# Patient Record
Sex: FEMALE | Race: BLACK OR AFRICAN AMERICAN | NOT HISPANIC OR LATINO | URBAN - METROPOLITAN AREA
[De-identification: names, ages, dates, MRNs, and addresses within clinical notes are randomized per-mention and may not be internally consistent; named-entity substitution may affect disease eponyms.]

---

## 2018-03-02 ENCOUNTER — AMBULATORY CONVERSION ENCOUNTER (OUTPATIENT)
Dept: FAMILY MEDICINE | Facility: CLINIC | Age: 23
End: 2018-03-02

## 2018-03-23 ENCOUNTER — OFFICE VISIT (OUTPATIENT)
Dept: OBSTETRICS AND GYNECOLOGY | Facility: CLINIC | Age: 23
End: 2018-03-23
Attending: OBSTETRICS & GYNECOLOGY
Payer: COMMERCIAL

## 2018-03-23 VITALS
SYSTOLIC BLOOD PRESSURE: 110 MMHG | HEIGHT: 66 IN | BODY MASS INDEX: 26.78 KG/M2 | WEIGHT: 166.6 LBS | DIASTOLIC BLOOD PRESSURE: 78 MMHG

## 2018-03-23 DIAGNOSIS — Z30.014 ENCOUNTER FOR INITIAL PRESCRIPTION OF INTRAUTERINE CONTRACEPTIVE DEVICE (IUD): ICD-10-CM

## 2018-03-23 DIAGNOSIS — Z01.419 ENCOUNTER FOR ANNUAL ROUTINE GYNECOLOGICAL EXAMINATION: Primary | ICD-10-CM

## 2018-03-23 DIAGNOSIS — Z11.3 SCREENING FOR STD (SEXUALLY TRANSMITTED DISEASE): ICD-10-CM

## 2018-03-23 PROCEDURE — S0610 ANNUAL GYNECOLOGICAL EXAMINA: HCPCS | Performed by: OBSTETRICS & GYNECOLOGY

## 2018-03-23 RX ORDER — LANOLIN ALCOHOL/MO/W.PET/CERES
500 CREAM (GRAM) TOPICAL DAILY
COMMUNITY
Start: 2017-07-12 | End: 2021-08-24 | Stop reason: HOSPADM

## 2018-03-23 RX ORDER — NORETHINDRONE ACETATE AND ETHINYL ESTRADIOL 1; 20 MG/1; UG/1
TABLET ORAL DAILY
Refills: 11 | COMMUNITY
Start: 2018-03-02 | End: 2018-03-27

## 2018-03-23 RX ORDER — ELECTROLYTES/DEXTROSE
5 SOLUTION, ORAL ORAL DAILY
COMMUNITY
Start: 2016-11-28 | End: 2021-08-24

## 2018-03-23 ASSESSMENT — PAIN SCALES - GENERAL: PAINLEVEL: 0-NO PAIN

## 2018-03-23 NOTE — PROGRESS NOTES
"3/23/2018  Stacie Hough is a 22 y.o. female who presents for annual exam.   Periods are regular every 28-30 days, lasting 5 days. Dysmenorrhea:mild, occurring first 1-2 days of flow. Cyclic symptoms include none. No intermenstrual bleeding, spotting, or discharge.    The patient is sexually active. GYN screening history: last pap: was normal. Domestic violence: not asked.     Current contraception: OCP (estrogen/progesterone)  History of abnormal Pap smear: no  Last pap: 2016, normal    Regular self breast exam: yes        Family history of breast cancer: no  Family history of uterine or ovarian cancer: no    Menstrual History:  OB History     No data available           Patient's last menstrual period was 03/08/2018 (exact date).         Review of Systems and Physical Exam  The following have been reviewed and updated as appropriate in this visit:      /78 (BP Location: Left upper arm, Patient Position: Sitting)   Ht 1.676 m (5' 6\")   Wt 75.6 kg (166 lb 9.6 oz)   LMP 03/08/2018 (Exact Date)   BMI 26.89 kg/m²   HPI  Review of Systems  Physical Exam   Constitutional: She is oriented to person, place, and time. She appears well-developed and well-nourished.   Genitourinary: Vagina normal and uterus normal. Pelvic exam was performed with patient in lithotomy exam position.   No labial rash or lesion.   External female genitalia normal. No signs of erythema. There is no lesion or tenderness on the right external genitalia. There is no tenderness on the left external genitalia.   Urethral meatus normal.   Urethra normal.   Normal bladder.   Vagina normal. Vagina exhibits no lesion. No tenderness or bleeding in the vagina. No vaginal discharge found.   Cervix exam normal.Cervix does not exhibit motion tenderness, discharge, friability or polyp.   Uterus is normal. Uterus is mobile. Uterus is not enlarged or tender. Uterine contour is regular.   Adnexa normal. Right adnexum does not display mass, does not " display tenderness, does not display fullness and present. Left adnexum does not display mass, does not display tenderness, does not display fullness and present.   HENT:   Head: Normocephalic and atraumatic.   Eyes: Conjunctivae are normal. Pupils are equal, round, and reactive to light.   Neck: Neck supple. No thyromegaly present.   Cardiovascular: Normal rate and regular rhythm.    Pulmonary/Chest: Effort normal and breath sounds normal. Breasts are symmetrical. There is no breast swelling.   Abdominal: Soft. Bowel sounds are normal. She exhibits no mass. There is no tenderness. There is no rebound and no guarding. No hernia.   Musculoskeletal: Normal range of motion.   Lymphadenopathy:     She has no cervical adenopathy.   Neurological: She is alert and oriented to person, place, and time.   Skin: Skin is warm.   Psychiatric: She has a normal mood and affect. Her behavior is normal.       Diagnoses and all orders for this visit:    Encounter for annual routine gynecological examination    Screening for STD (sexually transmitted disease)  -     Chlamydia/GC RNA:ThinPrep,Urine,Swab    Encounter for initial prescription of intrauterine contraceptive device (IUD)    .  Chlamydia specimen.  GC specimen..  Contraception: will change to Mirena, pamphlet given, she will return in one week for insertion  General Health Maintenance: Pap smear was normal last year.  She will get another one in 1-2 years.    We discussed healthy lifestyle behavior.  No Follow-up on file.  Sherry Prince MD

## 2018-03-26 LAB
C TRACH RRNA SPEC QL NAA+PROBE: NEGATIVE
N GONORRHOEA RRNA SPEC QL NAA+PROBE: NEGATIVE

## 2018-03-27 ENCOUNTER — PROCEDURE VISIT (OUTPATIENT)
Dept: OBSTETRICS AND GYNECOLOGY | Facility: CLINIC | Age: 23
End: 2018-03-27
Payer: COMMERCIAL

## 2018-03-27 VITALS — BODY MASS INDEX: 26.95 KG/M2 | SYSTOLIC BLOOD PRESSURE: 118 MMHG | DIASTOLIC BLOOD PRESSURE: 82 MMHG | WEIGHT: 167 LBS

## 2018-03-27 DIAGNOSIS — Z30.430 ENCOUNTER FOR IUD INSERTION: Primary | ICD-10-CM

## 2018-03-27 LAB — PREGNANCY TEST URINE, POC: NEGATIVE

## 2018-03-27 PROCEDURE — 81025 URINE PREGNANCY TEST: CPT | Performed by: OBSTETRICS & GYNECOLOGY

## 2018-03-27 PROCEDURE — 58300 INSERT INTRAUTERINE DEVICE: CPT | Performed by: OBSTETRICS & GYNECOLOGY

## 2018-03-27 ASSESSMENT — PAIN SCALES - GENERAL: PAINLEVEL: 0-NO PAIN

## 2018-03-27 NOTE — PROCEDURES
IUD Insertion  Date/Time: 3/27/2018 10:03 AM  Performed by: MARIANA NEAL  Authorized by: MARIANA NEAL     Consent:     Consent obtained:  Verbal and written    Consent given by:  Patient    Procedure risks and benefits discussed: yes      Patient questions answered: yes      Patient agrees, verbalizes understanding, and wants to proceed: yes    Procedure:     Pelvic exam performed: uterus was in mid postion.      Negative GC/chlamydia test: yes      Negative urine pregnancy test: yes      Cervix cleaned and prepped: yes      Speculum placed in vagina: yes      Tenaculum applied to cervix: yes      IUD inserted with no complications: yes      IUD type:  Mirena    Strings trimmed: yes      Uterus sound depth (cm):  8  Post-procedure:     Patient tolerated procedure well: yes      Patient will follow up after next period: yes    Comments:      IUD Insertion Procedure Note    Pre-operative Diagnosis: desires Mirena    Post-operative Diagnosis: same    Indications: contraception    Procedure Details   Urine pregnancy test was done today and result was negative.  The risks (including infection, bleeding, pain, and uterine perforation) and benefits of the procedure were explained to the patient and Written informed consent was obtained.      The uterus was in the mid position.  Cervix cleansed with Betadine x3.  A single-tooth tenaculum was placed on the anterior lip of the cervix.  Uterus sounded to 8 cm.  Mirena IUD inserted without difficulty. String visible and trimmed to 3cm. Patient tolerated procedure well.  She was monitored for about 5 minutes post procedure, and was discharged to home is stable condition.    IUD Information:  Mirena, Lot # [id number], Expiration date 08/20.    Condition:  Stable    Complications:  None    Plan:    The patient was advised to call for any fever or for prolonged or severe pain or bleeding. She was advised to use NSAID as needed for mild to moderate pain.     Attending Physician  Documentation:  I was present for or participated in the entire procedure, including opening and closing.

## 2018-04-17 ENCOUNTER — OFFICE VISIT (OUTPATIENT)
Dept: OBSTETRICS AND GYNECOLOGY | Facility: CLINIC | Age: 23
End: 2018-04-17
Payer: COMMERCIAL

## 2018-04-17 VITALS — WEIGHT: 170.8 LBS | DIASTOLIC BLOOD PRESSURE: 78 MMHG | BODY MASS INDEX: 27.57 KG/M2 | SYSTOLIC BLOOD PRESSURE: 122 MMHG

## 2018-04-17 DIAGNOSIS — Z30.431 IUD CHECK UP: Primary | ICD-10-CM

## 2018-04-17 PROCEDURE — 99213 OFFICE O/P EST LOW 20 MIN: CPT | Performed by: OBSTETRICS & GYNECOLOGY

## 2018-04-17 ASSESSMENT — PAIN SCALES - GENERAL: PAINLEVEL: 0-NO PAIN

## 2018-04-17 ASSESSMENT — ENCOUNTER SYMPTOMS
DIFFICULTY URINATING: 0
FREQUENCY: 0
HEADACHES: 1
CHILLS: 0
VOMITING: 0
DIARRHEA: 0
ABDOMINAL PAIN: 0
FEVER: 0

## 2018-07-03 ENCOUNTER — OFFICE VISIT (OUTPATIENT)
Dept: OBSTETRICS AND GYNECOLOGY | Facility: CLINIC | Age: 23
End: 2018-07-03
Payer: COMMERCIAL

## 2018-07-03 VITALS
WEIGHT: 172.2 LBS | SYSTOLIC BLOOD PRESSURE: 128 MMHG | DIASTOLIC BLOOD PRESSURE: 78 MMHG | BODY MASS INDEX: 27.68 KG/M2 | HEIGHT: 66 IN

## 2018-07-03 DIAGNOSIS — Z30.432 ENCOUNTER FOR IUD REMOVAL: Primary | ICD-10-CM

## 2018-07-03 PROCEDURE — 58301 REMOVE INTRAUTERINE DEVICE: CPT | Performed by: OBSTETRICS & GYNECOLOGY

## 2018-07-03 RX ORDER — CLOBETASOL PROPIONATE 0.5 MG/ML
SOLUTION TOPICAL
Refills: 3 | COMMUNITY
Start: 2018-06-08 | End: 2021-08-24

## 2018-07-03 ASSESSMENT — PAIN SCALES - GENERAL: PAINLEVEL: 0-NO PAIN

## 2018-07-03 NOTE — PROGRESS NOTES
The patient desires IUD removal due to an increase in migraine headaches.  She also notes skin issues with increase outbreak and melasma.  She does not want to get on contraception at this point in time.  She wants her body to normalize over the next 2 months.  She will call me if she desires any further contraception.  She will use condoms in the meantime.

## 2018-07-05 ENCOUNTER — TELEPHONE (OUTPATIENT)
Dept: OBSTETRICS AND GYNECOLOGY | Facility: CLINIC | Age: 23
End: 2018-07-05

## 2018-07-05 NOTE — TELEPHONE ENCOUNTER
PT WAS HERE 7/3 FOR REMOVAL OF IUD. SHE DECIDED SHE WOULD LIKE TO TRY THE B/C LO LOESTRIN FE INSTEAD OF ANOTHER IUD/OPTION. SHE WOULD LIKE TO DISCUSS WITH YOU. PLEASE ADVISE.

## 2018-07-05 NOTE — LETTER
Re: Stacie Hough    1995      To Whom It May Concern,    Stacie Hough has had issues with hormones in the past.  Most recently she was using the Mirena, and she  had melasma as well as headaches, which can be aggravated by hormones.  We are requesting that you authorize Lo Loestrin for her, as this has the lowest hormones on the market.    Sincerely,   Sherry Prince MD

## 2018-07-09 NOTE — TELEPHONE ENCOUNTER
I spoke to the patient.  She would like to try low Loestrin.  Of note she notes her headaches are better.  She spoke with the pharmacist who said that they need to have a reason for brand-name only.  She does have melasma and was sensitive to hormones in the past due to her headaches.  I will include that in the latter.  We will see if we can get this authorized for her.  I did prescribe it to the pharmacy as well.

## 2018-11-26 ENCOUNTER — TELEPHONE (OUTPATIENT)
Dept: OBSTETRICS AND GYNECOLOGY | Facility: CLINIC | Age: 23
End: 2018-11-26

## 2018-11-26 NOTE — TELEPHONE ENCOUNTER
Pt called because she wants to talk to you about starting NUVARING, her insurance no longer covers Lo Loestrin, pt is currently not on any BC right now, please give pt a call.

## 2018-11-28 RX ORDER — ETONOGESTREL AND ETHINYL ESTRADIOL VAGINAL RING .015; .12 MG/D; MG/D
RING VAGINAL
Qty: 3 EACH | Refills: 3 | Status: SHIPPED | OUTPATIENT
Start: 2018-11-28 | End: 2020-01-27

## 2018-11-28 NOTE — TELEPHONE ENCOUNTER
The patient would like to switch to nuvaring.  This was RX.  She will let me know if she has any issues.

## 2019-01-25 ENCOUNTER — OFFICE VISIT (OUTPATIENT)
Dept: OBSTETRICS AND GYNECOLOGY | Facility: CLINIC | Age: 24
End: 2019-01-25
Payer: COMMERCIAL

## 2019-01-25 VITALS
DIASTOLIC BLOOD PRESSURE: 78 MMHG | HEIGHT: 66 IN | SYSTOLIC BLOOD PRESSURE: 120 MMHG | BODY MASS INDEX: 27.97 KG/M2 | WEIGHT: 174 LBS

## 2019-01-25 DIAGNOSIS — N76.0 BACTERIAL VAGINOSIS: Primary | ICD-10-CM

## 2019-01-25 DIAGNOSIS — B96.89 BACTERIAL VAGINOSIS: Primary | ICD-10-CM

## 2019-01-25 DIAGNOSIS — B37.31 YEAST VAGINITIS: ICD-10-CM

## 2019-01-25 LAB
CLUE CELLS SPEC QL WET PREP: ABNORMAL
KOH PREP: YES
T VAGINALIS GENITAL QL WET PREP: ABNORMAL
WBC: 0 K/UL

## 2019-01-25 PROCEDURE — 87210 SMEAR WET MOUNT SALINE/INK: CPT | Performed by: OBSTETRICS & GYNECOLOGY

## 2019-01-25 PROCEDURE — 99214 OFFICE O/P EST MOD 30 MIN: CPT | Performed by: OBSTETRICS & GYNECOLOGY

## 2019-01-25 PROCEDURE — 87220 TISSUE EXAM FOR FUNGI: CPT | Performed by: OBSTETRICS & GYNECOLOGY

## 2019-01-25 RX ORDER — FLUCONAZOLE 150 MG/1
150 TABLET ORAL ONCE
Qty: 1 TABLET | Refills: 0 | Status: SHIPPED | OUTPATIENT
Start: 2019-01-25 | End: 2020-01-27

## 2019-01-25 RX ORDER — METRONIDAZOLE 500 MG/1
500 TABLET ORAL 2 TIMES DAILY
Qty: 14 TABLET | Refills: 0 | Status: SHIPPED | OUTPATIENT
Start: 2019-01-25 | End: 2020-01-27

## 2019-01-25 ASSESSMENT — ENCOUNTER SYMPTOMS
DIARRHEA: 0
FEVER: 0
VOMITING: 0
DIFFICULTY URINATING: 0
FREQUENCY: 0
CHILLS: 0
ABDOMINAL PAIN: 0

## 2019-01-25 NOTE — PROGRESS NOTES
The patient is a 23 y.o.  complaining of vaginal odor.  This has been going on for a few weeks.  She notes a thick white discharge.  She denies any itching or burning.  She denies any irritation.  The discharge is constant.  She is tried apple cider vinegar and that has not cured it.    Associated symptoms include: vaginal discharge and foul vaginal odor.    Aggravating symptoms include: intercourse.  She has had relief with nothing.    She has seen someone for this before about a year ago.  She was diagnosed with bacterial vaginosis.  Treatment included flagyl.  This has helped.        History reviewed. No pertinent past medical history.    Past Surgical History:   Procedure Laterality Date   • MAXILLARY LE FORTE I OSTEOTOMY  2017   • WISDOM TOOTH EXTRACTION  2016       Current Outpatient Prescriptions on File Prior to Visit   Medication Sig Dispense Refill   • ascorbic acid, vitamin C, (VITAMIN C) 500 mg CR capsule 500 mg daily.     • biotin 5 mg capsule 5 mg daily.     • clobetasol (TEMOVATE) 0.05 % external solution APPLY TWICE A DAY FOR UP TO 4 WEEKS  3   • etonogestrel-ethinyl estradiol (NUVARING) 0.12-0.015 mg/24 hr vaginal ring Insert vaginally and leave in for 3 consecutive weeks, then remove for 1 week. 3 each 3   • norethindrone-e.estradiol-iron (LO LOESTRIN FE) 1 mg-10 mcg (24)/10 mcg (2) per tablet Take 1 tablet by mouth daily. 84 tablet 3   • VITAMIN E ACETATE (VITAMIN E ORAL) Take by mouth daily.       No current facility-administered medications on file prior to visit.        Social History     Social History   • Marital status: Single     Spouse name: N/A   • Number of children: N/A   • Years of education: N/A     Occupational History   • Not on file.     Social History Main Topics   • Smoking status: Never Smoker   • Smokeless tobacco: Never Used   • Alcohol use Yes      Comment: Socially    • Drug use: No   • Sexual activity: Yes     Partners: Male     Other Topics Concern   • Not on  file     Social History Narrative   • No narrative on file       Family History   Problem Relation Age of Onset   • Hypertension Father    • Diabetes Maternal Grandmother    • Hypertension Paternal Grandfather    • Arrhythmia Paternal Grandfather        Review of Systems   Constitutional: Negative for chills and fever.   Gastrointestinal: Negative for abdominal pain, diarrhea and vomiting.   Genitourinary: Positive for vaginal discharge. Negative for difficulty urinating, frequency, vaginal itching and vaginal pain.       Physical Exam   Constitutional: She appears well-developed and well-nourished.   Genitourinary: Pelvic exam was performed with patient in lithotomy exam position.   No labial rash.   External female genitalia normal. No signs of erythema or atrophy.   Urethral meatus normal.   Urethra normal.   Normal bladder. There is no vaginal atrophy. No erythema in the vagina. Vaginal discharge found.   Cervix exam normal.Cervix does not exhibit motion tenderness. Uterus is not enlarged or tender. Right adnexum does not display mass and does not display tenderness. Left adnexum does not display mass and does not display tenderness.   Genitourinary Comments: Thick white vaginal discharge present   Eyes: Conjunctivae are normal.   Abdominal: Soft. She exhibits no distension and no mass. There is no tenderness. There is no guarding and no CVA tenderness. No hernia.         Assessment/Plan   The patient is a 23 y.o.  with yeast and BV..    1.  I will prescribe diflucan and flagyl.  She will not use ETOH..  2.  I asked her to avoid intercourse during treatment.  3.  If she is not better in the next week, she will call back.    Sherry Prince MD

## 2019-02-13 ENCOUNTER — TELEPHONE (OUTPATIENT)
Dept: OBSTETRICS AND GYNECOLOGY | Facility: CLINIC | Age: 24
End: 2019-02-13

## 2019-02-13 NOTE — TELEPHONE ENCOUNTER
Called and spoke with patient will mail out letter to patient today for last two visits with us. Verified address with patient.

## 2019-08-27 ENCOUNTER — OFFICE VISIT (OUTPATIENT)
Dept: OBSTETRICS AND GYNECOLOGY | Facility: CLINIC | Age: 24
End: 2019-08-27
Payer: COMMERCIAL

## 2019-08-27 VITALS
WEIGHT: 181 LBS | DIASTOLIC BLOOD PRESSURE: 80 MMHG | BODY MASS INDEX: 29.09 KG/M2 | SYSTOLIC BLOOD PRESSURE: 118 MMHG | HEIGHT: 66 IN

## 2019-08-27 DIAGNOSIS — Z11.3 SCREENING EXAMINATION FOR SEXUALLY TRANSMITTED DISEASE: Primary | ICD-10-CM

## 2019-08-27 PROCEDURE — 99213 OFFICE O/P EST LOW 20 MIN: CPT | Performed by: OBSTETRICS & GYNECOLOGY

## 2019-08-27 ASSESSMENT — ENCOUNTER SYMPTOMS
FREQUENCY: 0
DIARRHEA: 0
VOMITING: 0
FEVER: 0
ABDOMINAL PAIN: 0
DIFFICULTY URINATING: 0
CHILLS: 0

## 2019-08-27 NOTE — PROGRESS NOTES
The patient is a 23-year-old G0, P0 who presents today for STD screening.    She is had a new sexual partner since we last met.  She is interested in a full spectrum of testing.  She denies any symptoms such as vaginal discharge, irregular vaginal bleeding, or pelvic pain.    She occasionally uses the NuvaRing.  She usually uses it when [place] is back, [place].  She will use condoms as well.        No past medical history on file.    Past Surgical History:   Procedure Laterality Date   • MAXILLARY LE FORTE I OSTEOTOMY  2017   • WISDOM TOOTH EXTRACTION         Current Outpatient Prescriptions on File Prior to Visit   Medication Sig Dispense Refill   • ascorbic acid, vitamin C, (VITAMIN C) 500 mg CR capsule 500 mg daily.     • biotin 5 mg capsule 5 mg daily.     • clobetasol (TEMOVATE) 0.05 % external solution APPLY TWICE A DAY FOR UP TO 4 WEEKS  3   • etonogestrel-ethinyl estradiol (NUVARING) 0.12-0.015 mg/24 hr vaginal ring Insert vaginally and leave in for 3 consecutive weeks, then remove for 1 week. 3 each 3   • VITAMIN E ACETATE (VITAMIN E ORAL) Take by mouth daily.     • [] fluconazole (DIFLUCAN) 150 mg tablet Take 1 tablet (150 mg total) by mouth once for 1 dose. 1 tablet 0   • [] metroNIDAZOLE (FLAGYL) 500 mg tablet Take 1 tablet (500 mg total) by mouth 2 (two) times a day for 7 days. 14 tablet 0   • [] norethindrone-e.estradiol-iron (LO LOESTRIN FE) 1 mg-10 mcg (24)/10 mcg (2) per tablet Take 1 tablet by mouth daily. 84 tablet 3     No current facility-administered medications on file prior to visit.        Social History     Social History   • Marital status: Single     Spouse name: N/A   • Number of children: N/A   • Years of education: N/A     Occupational History   • Not on file.     Social History Main Topics   • Smoking status: Never Smoker   • Smokeless tobacco: Never Used   • Alcohol use Yes      Comment: Socially    • Drug use: No   • Sexual activity: Yes      Partners: Male     Other Topics Concern   • Not on file     Social History Narrative   • No narrative on file       Family History   Problem Relation Age of Onset   • Hypertension Father    • Diabetes Maternal Grandmother    • Hypertension Paternal Grandfather    • Arrhythmia Paternal Grandfather        Review of Systems   Constitutional: Negative for chills and fever.   Gastrointestinal: Negative for abdominal pain, diarrhea and vomiting.   Genitourinary: Negative for difficulty urinating, frequency and vaginal discharge.       Physical Exam   Constitutional: She appears well-developed and well-nourished.   Genitourinary: Pelvic exam was performed with patient in lithotomy exam position.   No labial rash.   External female genitalia normal. No signs of erythema or atrophy.   Urethral meatus normal.   Urethra normal.   Normal bladder. There is no vaginal atrophy. No erythema in the vagina. No vaginal discharge found.   Cervix exam normal.Cervix does not exhibit motion tenderness. Uterus is not enlarged or tender. Right adnexum does not display mass and does not display tenderness. Left adnexum does not display mass and does not display tenderness.   Genitourinary Comments: Small amount of white discharge in the vault.   Eyes: Conjunctivae are normal.   Abdominal: Soft. She exhibits no distension and no mass. There is no tenderness. There is no guarding and no CVA tenderness. No hernia.         Assessment/Plan     Stacie was seen today for sti/std screening.    Diagnoses and all orders for this visit:    Screening examination for sexually transmitted disease  -     Chlamydia/GC RNA:ThinPrep/Urine/Swab  -     HIV 1,2 AB P24 AG; Future  -     Hepatitis B surface antigen; Future  -     Syphilis Antibodies; Future  -     Hepatitis C antibody; Future  -     Herpes Simplex Virus 1/2 (IgG) w Reflex to HSV2; Future  -     HIV 1,2 AB P24 AG  -     Hepatitis B surface antigen  -     Syphilis Antibodies  -     Hepatitis C  antibody  -     Herpes Simplex Virus 1/2 (IgG) w Reflex to HSV2  -     CT, NG, TRICH VAGINALIS      I reviewed safe sex practices with the patient.  I will get the results of the work-up to her.  I explained specifically about HSV testing.  I explained that this only looks to see if she is ever been exposed.  I explained this will not tell me how long ago she was exposed, if she will ever have an outbreak, or if she ever has had an outbreak in the past.  She still desired that testing.  This was ordered for her.    Sherry Prince MD

## 2019-08-30 LAB
C TRACH RRNA VAG QL NAA+PROBE: NORMAL
N GONORRHOEA RRNA VAG QL NAA+PROBE: NORMAL
T VAGINALIS RRNA VAG QL NAA+PROBE: NEGATIVE

## 2019-09-03 LAB
C TRACH RRNA SPEC QL NAA+PROBE: NEGATIVE
N GONORRHOEA RRNA SPEC QL NAA+PROBE: NEGATIVE

## 2019-09-15 LAB
HBV SURFACE AG SERPL QL IA: NEGATIVE
HCV AB S/CO SERPL IA: <0.1 S/CO RATIO (ref 0–0.9)
HIV 1+2 AB+HIV1 P24 AG SERPL QL IA: NON REACTIVE
HSV1 IGG SER IA-ACNC: <0.91 INDEX (ref 0–0.9)
HSV2 IGG SER IA-ACNC: <0.91 INDEX (ref 0–0.9)

## 2019-09-18 ENCOUNTER — TELEPHONE (OUTPATIENT)
Dept: OBSTETRICS AND GYNECOLOGY | Facility: CLINIC | Age: 24
End: 2019-09-18

## 2019-09-18 DIAGNOSIS — Z11.3 SCREEN FOR STD (SEXUALLY TRANSMITTED DISEASE): Primary | ICD-10-CM

## 2019-09-18 LAB
RPR SER QL: NON REACTIVE
T PALLIDUM AB SER QL IA: POSITIVE
T PALLIDUM IGG SER QL IB: NEGATIVE

## 2019-09-18 NOTE — TELEPHONE ENCOUNTER
I called the patient.  She has no history of being exposed to syphilis.  Her RPR was negative.  Syphilis antibody test was positive but her T. Pallidum immunoblot was negative.    I think we should repeat testing in 1 month.  Patient is in agreement.  I offered her consultation with infectious diseases.  She would like to hold off on that until her next set of labs come back.  I will call her with results.

## 2019-09-19 ENCOUNTER — TELEPHONE (OUTPATIENT)
Dept: OBSTETRICS AND GYNECOLOGY | Facility: CLINIC | Age: 24
End: 2019-09-19

## 2019-09-19 NOTE — TELEPHONE ENCOUNTER
----- Message from Jose Terrazas MA sent at 9/18/2019  3:28 PM EDT -----  Regarding: Test Results Question  Contact: [phone]  Pt question about referral     ----- Message -----  From: Stacie Hough  Sent: 9/18/2019   3:17 PM  To: [facility]  Subject: Test Results Question                            Hi Dr. Prince,    After second thought, I think it will bother me everyday for the next month if I waited. Can you confirm the specialist you would refer me to?    Also, Is there a reason why I would need to wait 1 month to retest. I have not been sexually active in roughly 7 weeks. Not sure if waiting has to do with exposure time or not.

## 2019-09-19 NOTE — TELEPHONE ENCOUNTER
I called the patient back.  I explained that traversing a lot more false positives at the knee with her doing testing.  I think that to be completely sure that we should repeat in 1 month just because sometimes it takes a while for things to show up was positive.  I asked her to call me back to go over this.

## 2019-09-20 NOTE — TELEPHONE ENCOUNTER
I called the patient and left a voicemail message saying that I am sorry that were playing phone tag.  A briefly reviewed that I think overall she is negative but she wants to have a test done in 1 more months she could.  I will try and contact her next week.

## 2020-01-23 ENCOUNTER — TELEPHONE (OUTPATIENT)
Dept: OBSTETRICS AND GYNECOLOGY | Facility: CLINIC | Age: 25
End: 2020-01-23

## 2020-01-27 ENCOUNTER — OFFICE VISIT (OUTPATIENT)
Dept: PRIMARY CARE | Facility: CLINIC | Age: 25
End: 2020-01-27
Payer: COMMERCIAL

## 2020-01-27 VITALS
HEIGHT: 66 IN | WEIGHT: 177.2 LBS | RESPIRATION RATE: 18 BRPM | OXYGEN SATURATION: 98 % | SYSTOLIC BLOOD PRESSURE: 116 MMHG | HEART RATE: 71 BPM | BODY MASS INDEX: 28.48 KG/M2 | DIASTOLIC BLOOD PRESSURE: 70 MMHG | TEMPERATURE: 98 F

## 2020-01-27 DIAGNOSIS — R61 HYPERHIDROSIS: ICD-10-CM

## 2020-01-27 DIAGNOSIS — Z00.00 ENCOUNTER FOR GENERAL ADULT MEDICAL EXAMINATION WITHOUT ABNORMAL FINDINGS: Primary | ICD-10-CM

## 2020-01-27 DIAGNOSIS — E53.8 B12 DEFICIENCY: ICD-10-CM

## 2020-01-27 PROBLEM — M26.02 MAXILLARY HYPOPLASIA: Status: ACTIVE | Noted: 2017-12-14

## 2020-01-27 PROCEDURE — 99385 PREV VISIT NEW AGE 18-39: CPT | Performed by: FAMILY MEDICINE

## 2020-01-27 NOTE — PROGRESS NOTES
Elise Daniels D.O.   [facility] in [address], [address]  [place] [address]  [phone]  Fax [phone]          History of Present Illness   Patient ID: Stacie Hough is a 24 y.o. female.    Subjective   Pt is here for a NPP.    HPI  Patient is a 24-year-old new patient to me here for her physical.  Last gyn exam  2019: clear.    Pt offers no complaints and states feel well.   Due for updated labs.   Patient has had some changes in diet and is was trying a vegan lifestyle.  Currently not on a multivitamin.  Did have IUD but had side effect of opthal migraine that resolved after pt stopped med.   --Patient does have episodes of chronic sweating.  Was doing Botox injections to underarm area but also feels that under breasts and in inner thigh area.  Patient tries to deal with it with trying to lay her close    Has not had mammogram or colonoscopy yet.       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       Past Surgical History:   Procedure Laterality Date   • MAXILLARY LE FORTE I OSTEOTOMY  12/13/2017   • WISDOM TOOTH EXTRACTION  2016       Family History   Problem Relation Age of Onset   • Hypertension Biological Father    • Hyperlipidemia Biological Father    • Diabetes Maternal Grandmother    • Hypertension Paternal Grandfather    • Arrhythmia Paternal Grandfather    • No Known Problems Biological Mother    • No Known Problems Biological Sister    • Hypertension Biological Brother    • Heart failure Biological Brother    • Heart disease Biological Brother    • Arrhythmia Paternal Grandmother    • Heart disease Paternal Grandmother        Social History     Socioeconomic History   • Marital status: Single     Spouse name: Not on  file   • Number of children: Not on file   • Years of education: Not on file   • Highest education level: Not on file   Occupational History   • Not on file   Social Needs   • Financial resource strain: Not on file   • Food insecurity:     Worry: Not on file     Inability: Not on file   • Transportation needs:     Medical: Not on file     Non-medical: Not on file   Tobacco Use   • Smoking status: Never Smoker   • Smokeless tobacco: Never Used   Substance and Sexual Activity   • Alcohol use: Yes     Comment: Socially    • Drug use: No   • Sexual activity: Yes     Partners: Male   Lifestyle   • Physical activity:     Days per week: Not on file     Minutes per session: Not on file   • Stress: Not on file   Relationships   • Social connections:     Talks on phone: Not on file     Gets together: Not on file     Attends [employer] service: Not on file     Active member of club or organization: Not on file     Attends meetings of clubs or organizations: Not on file     Relationship status: Not on file   • Intimate partner violence:     Fear of current or ex partner: Not on file     Emotionally abused: Not on file     Physically abused: Not on file     Forced sexual activity: Not on file   Other Topics Concern   • Not on file   Social History Narrative   • Not on file      Allergies and Medications     Allergies   Allergen Reactions   • No Known Allergies          Current Outpatient Medications:   •  aluminum chloride (DRYSOL) 20 % external solution, Apply topically nightly., Disp: 35 mL, Rfl: 0  •  ascorbic acid, vitamin C, (VITAMIN C) 500 mg CR capsule, 500 mg daily., Disp: , Rfl:   •  biotin 5 mg capsule, 5 mg daily., Disp: , Rfl:   •  clobetasol (TEMOVATE) 0.05 % external solution, APPLY TWICE A DAY FOR UP TO 4 WEEKS, Disp: , Rfl: 3   Review of Systems       Review of Systems   Constitutional: Negative for activity change, appetite change, fatigue and unexpected weight change.   HENT: Negative for congestion, dental  "problem, ear pain, hearing loss, postnasal drip, sinus pressure, sinus pain, sore throat and trouble swallowing.    Eyes: Negative for pain, discharge and visual disturbance.   Respiratory: Negative for cough, choking, chest tightness, shortness of breath and wheezing.    Cardiovascular: Negative for chest pain and palpitations.   Gastrointestinal: Negative for abdominal pain, constipation, diarrhea and nausea.   Endocrine: Negative for cold intolerance and heat intolerance.   Genitourinary: Negative for difficulty urinating, dysuria, frequency, menstrual problem, pelvic pain, vaginal discharge and vaginal pain.   Musculoskeletal: Negative for back pain, gait problem and neck pain.   Skin: Negative for color change and rash.   Allergic/Immunologic: Negative for food allergies.   Neurological: Negative for dizziness, weakness, light-headedness and headaches.   Hematological: Does not bruise/bleed easily.   Psychiatric/Behavioral: Negative for behavioral problems, decreased concentration, dysphoric mood, self-injury, sleep disturbance and suicidal ideas. The patient is not nervous/anxious and is not hyperactive.       Physical Examination       Objective     Vitals:    01/27/20 1336   BP: 116/70   Pulse: 71   Resp: 18   Temp: 36.7 °C (98 °F)   SpO2: 98%       Vitals:    01/27/20 1336   BP: 116/70   Pulse: 71   Resp: 18   Temp: 36.7 °C (98 °F)   TempSrc: Oral   SpO2: 98%   Weight: 80.4 kg (177 lb 3.2 oz)   Height: 1.676 m (5' 6\")       Wt Readings from Last 3 Encounters:   01/27/20 80.4 kg (177 lb 3.2 oz)   08/27/19 82.1 kg (181 lb)   01/25/19 78.9 kg (174 lb)       Ht Readings from Last 3 Encounters:   01/27/20 1.676 m (5' 6\")   08/27/19 1.676 m (5' 6\")   01/25/19 1.676 m (5' 6\")       BP Readings from Last 3 Encounters:   01/27/20 116/70   08/27/19 118/80   01/25/19 120/78       Physical Exam   Constitutional: She is oriented to person, place, and time. She appears well-developed and well-nourished.   HENT:   Head: " Normocephalic and atraumatic.   Right Ear: External ear normal.   Left Ear: External ear normal.   Nose: Nose normal.   Mouth/Throat: Oropharynx is clear and moist. No oropharyngeal exudate.   Eyes: Pupils are equal, round, and reactive to light. Conjunctivae and EOM are normal. Right eye exhibits no discharge. Left eye exhibits no discharge.   Neck: Normal range of motion. Neck supple. No thyromegaly present.   Cardiovascular: Normal rate, regular rhythm, normal heart sounds and intact distal pulses.   No murmur heard.  Pulmonary/Chest: Effort normal and breath sounds normal. She has no wheezes. She has no rales. She exhibits no tenderness.   Abdominal: Soft. Bowel sounds are normal. There is no tenderness. There is no guarding. No hernia.   Musculoskeletal: Normal range of motion. She exhibits no tenderness or deformity.   Lymphadenopathy:     She has no cervical adenopathy.   Neurological: She is alert and oriented to person, place, and time. She displays normal reflexes. No cranial nerve deficit or sensory deficit.   Skin: Skin is warm and dry. Capillary refill takes less than 2 seconds. No erythema.   Psychiatric: She has a normal mood and affect. Her behavior is normal. Judgment and thought content normal.   Nursing note and vitals reviewed.     Laboratory Results     Lab Results   Component Value Date    WBC 0.0 01/25/2019          Chemistry    No results found for: NA, K, CL, CO2, BUN, CREATININE, GLU No results found for: CALCIUM, ALKPHOS, AST, ALT, BILITOT         No results found for: GLUCOSE, CALCIUM, NA, K, CO2, CL, BUN, CREATININE    No results found for: CHOL  No results found for: HDL  No results found for: LDLCALC  No results found for: TRIG  No results found for: CHOLHDL    No results found for: TSH    No results found for: HGBA1C    Lab Results   Component Value Date    HEPCAB <0.1 09/14/2019           There is no immunization history on file for this patient.       Assessment and Plan        Assessment/Plan     Problem List Items Addressed This Visit        Digestive    B12 deficiency    Current Assessment & Plan     Pt has had change in diet and will check b12 level.          Relevant Orders    Vitamin B12       Other    Hyperhidrosis    Current Assessment & Plan     botox injections in past/ helpful.    --Had not tried Drysol.  Will give trial of Drysol to use at other areas of her body that have not had Botox injections.           Encounter for general adult medical examination without abnormal findings - Primary    Current Assessment & Plan     Normal Exam.  Updated interval history.  Reviewed medications, allergies, PMH,FH,alcohol/tobacco/drug use.  Diet and exercise counseling given.   -Age appropriate health Maintenance reviewed.  -Immunizations - reviewed and are up to date   -She is UTD with GYN Exam   -Routine lab panel was ordered.   -Recommend referral for routine eye exam with ophthalmology       Counseling regarding healthy eating habits and a diet low in saturated fats was given.    Exercises often  Patient verbalizes an understanding of the treatment plan discussed at today's visit.             Relevant Orders    Lipid panel    Comprehensive metabolic panel    CBC          No follow-ups on file.    Orders Placed This Encounter   Procedures   • Lipid panel     Standing Status:   Future     Number of Occurrences:   1     Standing Expiration Date:   1/27/2021   • Comprehensive metabolic panel     Standing Status:   Future     Number of Occurrences:   1     Standing Expiration Date:   1/27/2021   • CBC     Standing Status:   Future     Number of Occurrences:   1     Standing Expiration Date:   1/27/2021   • Vitamin B12     Standing Status:   Future     Number of Occurrences:   1     Standing Expiration Date:   1/27/2021        Elise Daniels, DO Elise Daniels DO  2/2/2020

## 2020-01-27 NOTE — ASSESSMENT & PLAN NOTE
botox injections in past/ helpful.    --Had not tried Drysol.  Will give trial of Drysol to use at other areas of her body that have not had Botox injections.

## 2020-02-02 PROBLEM — E53.8 B12 DEFICIENCY: Status: ACTIVE | Noted: 2020-02-02

## 2020-02-02 PROBLEM — M26.02 MAXILLARY HYPOPLASIA: Status: RESOLVED | Noted: 2017-12-14 | Resolved: 2020-02-02

## 2020-02-02 ASSESSMENT — ENCOUNTER SYMPTOMS
HEADACHES: 0
FREQUENCY: 0
NECK PAIN: 0
SINUS PAIN: 0
SLEEP DISTURBANCE: 0
TROUBLE SWALLOWING: 0
ABDOMINAL PAIN: 0
SINUS PRESSURE: 0
FATIGUE: 0
CONSTIPATION: 0
NERVOUS/ANXIOUS: 0
EYE PAIN: 0
SHORTNESS OF BREATH: 0
DYSPHORIC MOOD: 0
COLOR CHANGE: 0
WHEEZING: 0
BRUISES/BLEEDS EASILY: 0
CHEST TIGHTNESS: 0
COUGH: 0
APPETITE CHANGE: 0
DIARRHEA: 0
DIFFICULTY URINATING: 0
NAUSEA: 0
UNEXPECTED WEIGHT CHANGE: 0
BACK PAIN: 0
DYSURIA: 0
EYE DISCHARGE: 0
DIZZINESS: 0
SORE THROAT: 0
PALPITATIONS: 0
DECREASED CONCENTRATION: 0
WEAKNESS: 0
HYPERACTIVE: 0
LIGHT-HEADEDNESS: 0
ACTIVITY CHANGE: 0
CHOKING: 0

## 2020-02-02 NOTE — ASSESSMENT & PLAN NOTE
Normal Exam.  Updated interval history.  Reviewed medications, allergies, PMH,FH,alcohol/tobacco/drug use.  Diet and exercise counseling given.   -Age appropriate health Maintenance reviewed.  -Immunizations - reviewed and are up to date   -She is UTD with GYN Exam   -Routine lab panel was ordered.   -Recommend referral for routine eye exam with ophthalmology       Counseling regarding healthy eating habits and a diet low in saturated fats was given.    Exercises often  Patient verbalizes an understanding of the treatment plan discussed at today's visit.

## 2020-02-21 ENCOUNTER — TELEPHONE (OUTPATIENT)
Dept: OBSTETRICS AND GYNECOLOGY | Facility: CLINIC | Age: 25
End: 2020-02-21

## 2020-02-21 RX ORDER — ETONOGESTREL AND ETHINYL ESTRADIOL VAGINAL RING .015; .12 MG/D; MG/D
RING VAGINAL
Qty: 1 EACH | Refills: 1 | Status: SHIPPED | OUTPATIENT
Start: 2020-02-21 | End: 2020-05-11 | Stop reason: SDUPTHER

## 2020-02-21 NOTE — TELEPHONE ENCOUNTER
Made annual appointment for April 27 in [facility] with Dr Prince.  Would like refill of Nuvaring. Please call in.  Thank you

## 2020-05-11 RX ORDER — FLUCONAZOLE 150 MG/1
150 TABLET ORAL DAILY
Qty: 1 TABLET | Refills: 1 | Status: SHIPPED | OUTPATIENT
Start: 2020-05-11 | End: 2020-05-12

## 2020-05-11 RX ORDER — ETONOGESTREL AND ETHINYL ESTRADIOL VAGINAL RING .015; .12 MG/D; MG/D
RING VAGINAL
Qty: 3 EACH | Refills: 0 | Status: SHIPPED | OUTPATIENT
Start: 2020-05-11 | End: 2020-06-05

## 2020-09-11 ENCOUNTER — OFFICE VISIT (OUTPATIENT)
Dept: OBSTETRICS AND GYNECOLOGY | Facility: CLINIC | Age: 25
End: 2020-09-11
Payer: COMMERCIAL

## 2020-09-11 VITALS
WEIGHT: 175 LBS | SYSTOLIC BLOOD PRESSURE: 110 MMHG | BODY MASS INDEX: 28.12 KG/M2 | HEIGHT: 66 IN | DIASTOLIC BLOOD PRESSURE: 74 MMHG

## 2020-09-11 DIAGNOSIS — Z01.419 WOMEN'S ANNUAL ROUTINE GYNECOLOGICAL EXAMINATION: Primary | ICD-10-CM

## 2020-09-11 DIAGNOSIS — Z11.3 SCREEN FOR STD (SEXUALLY TRANSMITTED DISEASE): ICD-10-CM

## 2020-09-11 PROCEDURE — 99395 PREV VISIT EST AGE 18-39: CPT | Performed by: OBSTETRICS & GYNECOLOGY

## 2020-09-11 RX ORDER — ETONOGESTREL AND ETHINYL ESTRADIOL VAGINAL RING .015; .12 MG/D; MG/D
RING VAGINAL
Qty: 3 EACH | Refills: 3 | Status: SHIPPED | OUTPATIENT
Start: 2020-09-11 | End: 2021-03-26 | Stop reason: SDUPTHER

## 2020-09-11 NOTE — PROGRESS NOTES
"2020  Stacie Hough is a 24 y.o. female who presents for annual exam.   Periods are regular every 28-30 days, lasting 4 days. Dysmenorrhea:none. Cyclic symptoms include none. No intermenstrual bleeding, spotting, or discharge.    The patient is not currently sexually active. GYN screening history: last pap: was normal. Domestic violence: no.     Current contraception: none  History of abnormal Pap smear: no  Last pap: 2016, normal    Regular self breast exam: yes        Family history of breast cancer: no  Family history of uterine or ovarian cancer: no    She has never had a colonoscopy.    History of abnormal lipids: no  She does wear sunscreen.  She does wear her seatbelt.  Gardasil: has not received the series    Menstrual History:  OB History        0    Para   0    Term   0       0    AB   0    Living   0       SAB   0    TAB   0    Ectopic   0    Multiple   0    Live Births   0                Patient's last menstrual period was 2020.         Review of Systems and Physical Exam  The following have been reviewed and updated as appropriate in this visit:      Visit Vitals  /74 (BP Location: Left upper arm, Patient Position: Sitting)   Ht 1.676 m (5' 6\")   Wt 79.4 kg (175 lb)   LMP 2020   BMI 28.25 kg/m²     HPI  Review of Systems  Physical Exam   Constitutional: She is oriented to person, place, and time. She appears well-developed and well-nourished.   Genitourinary: Vagina normal and uterus normal. Pelvic exam was performed with patient in lithotomy exam position.   No labial rash or lesion.   External female genitalia normal. No signs of erythema. There is no lesion or tenderness on the right external genitalia. There is no tenderness on the left external genitalia.   Urethral meatus normal.   Urethra normal.   Normal bladder.   Vagina normal. Vagina exhibits no lesion. No tenderness or bleeding in the vagina. No vaginal discharge found.   Cervix exam normal.Cervix does " not exhibit motion tenderness, discharge, friability or polyp.   Uterus is normal. Uterus is mobile. Uterus is not enlarged or tender. Uterine contour is regular.   Adnexa normal. Right adnexum does not display mass, does not display tenderness, does not display fullness and present. Left adnexum does not display mass, does not display tenderness, does not display fullness and present.   HENT:   Head: Normocephalic and atraumatic.   Neck: Neck supple. No thyromegaly present.   Cardiovascular: Normal rate and regular rhythm.   Pulmonary/Chest: Effort normal and breath sounds normal. Right breast exhibits no mass and no skin change. Left breast exhibits no mass and no skin change. No breast swelling, tenderness, discharge or bleeding. Breasts are symmetrical.   Abdominal: Soft. Bowel sounds are normal. She exhibits no mass. There is no tenderness. There is no rebound, no guarding and no CVA tenderness. No hernia.   Musculoskeletal: Normal range of motion.   Lymphadenopathy:     She has no cervical adenopathy.   Neurological: She is alert and oriented to person, place, and time.   Skin: Skin is warm.   Psychiatric: She has a normal mood and affect. Her behavior is normal.       Diagnoses and all orders for this visit:    Women's annual routine gynecological examination  -     Cytology, Thinprep Pap  -     PAP W/REFLEX HR HPV & CT/NG/TV    Screen for STD (sexually transmitted disease)  -     Syphilis Antibodies  -     HIV 1,2 AB P24 AG  -     Hepatitis B surface antigen; Future  -     Hepatitis C antibody; Future    Other orders  -     etonogestreL-ethinyl estradioL (NUVARING) 0.12-0.015 mg/24 hr vaginal ring; Insert vaginally and leave in for 3 consecutive weeks, then remove for 1 week.    .  Breast self exam technique reviewed and patient encouraged to perform self-exam monthly.  Discussed healthy lifestyle modifications.  Pap smear..  Contraception: she will start up nuvaring  General Health Maintenance:pap done,  repeat STD labs  Recommended gardisil  She will think about it.  No follow-ups on file.  Sherry Prince MD

## 2020-09-15 LAB
C TRACH RRNA SPEC QL NAA+PROBE: NOT DETECTED
CLINICAL INFO: NORMAL
CYTO CVX: NORMAL
CYTOLOGIST CVX/VAG CYTO: NORMAL
CYTOLOGY CMNT CVX/VAG CYTO-IMP: NORMAL
DATE OF PREVIOUS PAP: NORMAL
DATE PREVIOUS BX: NORMAL
LMP START DATE: NORMAL
N GONORRHOEA RRNA SPEC QL NAA+PROBE: NOT DETECTED
QST (ALWAYS MESSAGE): NORMAL
QUEST COMMENT (PAP): NORMAL
SPECIMEN SOURCE CVX/VAG CYTO: NORMAL
STAT OF ADQ CVX/VAG CYTO-IMP: NORMAL
T VAGINALIS RRNA SPEC QL NAA+PROBE: NOT DETECTED

## 2020-09-20 LAB
HBV SURFACE AG SERPL QL IA: NORMAL
HCV AB S/CO SERPL IA: 0.03
HCV AB SERPL QL IA: NORMAL
HIV 1+2 AB+HIV1 P24 AG SERPL QL IA: NORMAL
T PALLIDUM AB SER QL IA: NEGATIVE

## 2020-10-05 RX ORDER — METRONIDAZOLE 500 MG/1
500 TABLET ORAL 2 TIMES DAILY
Qty: 14 TABLET | Refills: 0 | Status: SHIPPED | OUTPATIENT
Start: 2020-10-05 | End: 2020-10-12

## 2020-10-09 ENCOUNTER — TELEPHONE (OUTPATIENT)
Dept: PRIMARY CARE | Facility: CLINIC | Age: 25
End: 2020-10-09

## 2020-10-09 NOTE — TELEPHONE ENCOUNTER
Pt denies any discomfort  States mild swelling remains to facial areas & lips-- speech clear    Advised to contact pcp Monday if symptoms continue    Pt to contact restaurant to determine other possible ingredients that may have been used  states no hx of allergies

## 2020-10-09 NOTE — TELEPHONE ENCOUNTER
----- Message from Reta Zheng RN sent at 10/9/2020 12:46 PM EDT -----  Regarding: FW: Non-Urgent Medical Question  Contact: [phone]  Candice, can you please call patient for me.      ----- Message -----  From: Lucrecia Leo MA  Sent: 10/9/2020  12:03 PM EDT  To: Reta Zheng RN  Subject: FW: Non-Urgent Medical Question                    ----- Message -----  From: Stacie Hough  Sent: 10/9/2020  11:55 AM EDT  To: [facility]  Subject: Non-Urgent Medical Question                      Shaista Rosario,    I experienced an allergic reaction around 11 PM last night. The resulting hives and bumps have now cleared, however my lips and face are still very swollen. Is it normal for the swelling to stick around this long after the reaction even though hives have cleared up? (12 hrs have passed now)    Please note that I have never experienced an allergic reaction and have no known allergies.  I had take out from a new restaurant last night but ordered a meal I would normally eat.

## 2020-11-23 RX ORDER — FLUCONAZOLE 150 MG/1
TABLET ORAL
Qty: 1 TABLET | Refills: 1 | Status: SHIPPED | OUTPATIENT
Start: 2020-11-23 | End: 2021-05-06 | Stop reason: SDUPTHER

## 2021-04-28 ENCOUNTER — TELEPHONE (OUTPATIENT)
Dept: PRIMARY CARE | Facility: CLINIC | Age: 26
End: 2021-04-28

## 2021-04-28 NOTE — PROGRESS NOTES
The patient is a 22 y.o. , who had a Mirena IUD placed on 3/27/18.  Since then she has noticed no pain.  She has had light spotting since then.      Other side effects include headaches.  Overall, she is willing to try it longer.    She notes that last week she had several days of his strong migraine headache.  She also had associated blurry vision with this.  She is does not normally get headaches.  She wonders if the Mirena has triggered this.            Review of Systems   Constitutional: Negative for chills and fever.   Gastrointestinal: Negative for abdominal pain, diarrhea and vomiting.   Genitourinary: Negative for difficulty urinating, frequency and vaginal discharge.   Neurological: Positive for headaches.       Physical Exam   Constitutional: She appears well-developed and well-nourished.   Genitourinary: Vagina normal and uterus normal. Pelvic exam was performed with patient in prone position.     External female genitalia normal.   Urethral meatus normal.   Urethra normal.   Normal bladder.   Vagina normal.   Cervix exam normal.  Cervix exhibits visible IUD strings.   Uterus is normal.   Adnexa normal.   HENT:   Head: Normocephalic.   Eyes: Conjunctivae are normal.   Abdominal: Soft. Bowel sounds are normal. There is no tenderness.         Assessment/Plan   The patient is a 22 y.o.  for an IUD check up.  1.  IUD strings appear to be WNL.  2. She will call with any questions or concerns.  She will monitor her headaches.  If it appears that she now has new onset worsening headaches and desires the Mirena removal, she will call us and let us know.  3. She will return for annual in one year.  
0

## 2021-05-06 ENCOUNTER — TELEPHONE (OUTPATIENT)
Dept: OBSTETRICS AND GYNECOLOGY | Facility: CLINIC | Age: 26
End: 2021-05-06

## 2021-05-06 RX ORDER — FLUCONAZOLE 150 MG/1
150 TABLET ORAL ONCE
Qty: 1 TABLET | Refills: 1 | Status: SHIPPED | OUTPATIENT
Start: 2021-05-06 | End: 2021-05-06

## 2021-05-06 RX ORDER — FLUCONAZOLE 150 MG/1
150 TABLET ORAL ONCE
Qty: 2 TABLET | Refills: 1 | Status: SHIPPED | OUTPATIENT
Start: 2021-05-06 | End: 2021-05-06

## 2021-05-06 NOTE — TELEPHONE ENCOUNTER
Portal message sent for pt to make appt for further refills.       Medicine Refill Request    Last Office Visit: 1/27/2020  Last Telemedicine Visit: Visit date not found    Next Office Visit: Visit date not found  Next Telemedicine Visit: Visit date not found         Current Outpatient Medications:   •  aluminum chloride (DRYSOL) 20 % external solution, Apply topically nightly., Disp: 35 mL, Rfl: 0  •  ascorbic acid, vitamin C, (VITAMIN C) 500 mg CR capsule, 500 mg daily., Disp: , Rfl:   •  biotin 5 mg capsule, 5 mg daily., Disp: , Rfl:   •  clobetasol (TEMOVATE) 0.05 % external solution, APPLY TWICE A DAY FOR UP TO 4 WEEKS, Disp: , Rfl: 3  •  etonogestreL-ethinyl estradioL (NUVARING) 0.12-0.015 mg/24 hr vaginal ring, Insert vaginally and leave in for 3 consecutive weeks, then remove for 1 week., Disp: 3 each, Rfl: 2  •  fluconazole (DIFLUCAN) 150 mg tablet, TAKE 1 TABLET (150 MG TOTAL) BY MOUTH DAILY FOR 1 DAY., Disp: 1 tablet, Rfl: 1      BP Readings from Last 3 Encounters:   09/11/20 110/74   01/27/20 116/70   08/27/19 118/80       Recent Lab results:  No results found for: CHOL, No results found for: HDL, No results found for: LDLCALC, No results found for: TRIG     No results found for: GLUCOSE, No results found for: HGBA1C      No results found for: CREATININE    No results found for: TSH

## 2021-05-06 NOTE — TELEPHONE ENCOUNTER
Pt asking for med to be refilled.    Current Outpatient Medications on File Prior to Visit   Medication Sig Dispense Refill   • aluminum chloride (DRYSOL) 20 % external solution Apply topically nightly. 35 mL 0   • ascorbic acid, vitamin C, (VITAMIN C) 500 mg CR capsule 500 mg daily.     • biotin 5 mg capsule 5 mg daily.     • clobetasol (TEMOVATE) 0.05 % external solution APPLY TWICE A DAY FOR UP TO 4 WEEKS  3   • etonogestreL-ethinyl estradioL (NUVARING) 0.12-0.015 mg/24 hr vaginal ring Insert vaginally and leave in for 3 consecutive weeks, then remove for 1 week. 3 each 2   • fluconazole (DIFLUCAN) 150 mg tablet TAKE 1 TABLET (150 MG TOTAL) BY MOUTH DAILY FOR 1 DAY. 1 tablet 1     No current facility-administered medications on file prior to visit.

## 2021-05-06 NOTE — TELEPHONE ENCOUNTER
Patient requesting Diflucan due to yeast infection symptoms. Symptoms started on Tuesday. Please give patient a call back with any questions.

## 2021-05-07 ENCOUNTER — TELEPHONE (OUTPATIENT)
Dept: PRIMARY CARE | Facility: CLINIC | Age: 26
End: 2021-05-07

## 2021-05-07 NOTE — TELEPHONE ENCOUNTER
Patient needs a refill of diflucan 150 mg tablet. Patient does have an appointment scheduled for 6/7/21. Please order it from [facility].  [phone]

## 2021-05-11 RX ORDER — FLUCONAZOLE 150 MG/1
150 TABLET ORAL ONCE
Qty: 1 TABLET | Refills: 0 | Status: SHIPPED | OUTPATIENT
Start: 2021-05-11 | End: 2021-05-11

## 2021-05-11 NOTE — TELEPHONE ENCOUNTER
Pt sent a message asking to call in Diflucan. Pt did not offer a reason why.     Medicine Refill Request    Last Office Visit: 1/27/2020  Last Telemedicine Visit: Visit date not found    Next Office Visit: 6/7/2021  Next Telemedicine Visit: Visit date not found         Current Outpatient Medications:   •  aluminum chloride (DRYSOL) 20 % external solution, Apply topically nightly., Disp: 35 mL, Rfl: 0  •  ascorbic acid, vitamin C, (VITAMIN C) 500 mg CR capsule, 500 mg daily., Disp: , Rfl:   •  biotin 5 mg capsule, 5 mg daily., Disp: , Rfl:   •  clobetasol (TEMOVATE) 0.05 % external solution, APPLY TWICE A DAY FOR UP TO 4 WEEKS, Disp: , Rfl: 3  •  etonogestreL-ethinyl estradioL (NUVARING) 0.12-0.015 mg/24 hr vaginal ring, Insert vaginally and leave in for 3 consecutive weeks, then remove for 1 week., Disp: 3 each, Rfl: 2      BP Readings from Last 3 Encounters:   09/11/20 110/74   01/27/20 116/70   08/27/19 118/80       Recent Lab results:  No results found for: CHOL, No results found for: HDL, No results found for: LDLCALC, No results found for: TRIG     No results found for: GLUCOSE, No results found for: HGBA1C      No results found for: CREATININE    No results found for: TSH

## 2021-08-24 ENCOUNTER — OFFICE VISIT (OUTPATIENT)
Dept: PRIMARY CARE | Facility: CLINIC | Age: 26
End: 2021-08-24
Payer: COMMERCIAL

## 2021-08-24 VITALS
DIASTOLIC BLOOD PRESSURE: 88 MMHG | HEART RATE: 92 BPM | WEIGHT: 196 LBS | RESPIRATION RATE: 16 BRPM | TEMPERATURE: 98.1 F | SYSTOLIC BLOOD PRESSURE: 138 MMHG | HEIGHT: 67 IN | BODY MASS INDEX: 30.76 KG/M2 | OXYGEN SATURATION: 98 %

## 2021-08-24 DIAGNOSIS — Z00.00 ENCOUNTER FOR GENERAL ADULT MEDICAL EXAMINATION WITHOUT ABNORMAL FINDINGS: Primary | ICD-10-CM

## 2021-08-24 DIAGNOSIS — R03.0 ELEVATED BP WITHOUT DIAGNOSIS OF HYPERTENSION: ICD-10-CM

## 2021-08-24 PROCEDURE — 3008F BODY MASS INDEX DOCD: CPT | Performed by: FAMILY MEDICINE

## 2021-08-24 PROCEDURE — 99395 PREV VISIT EST AGE 18-39: CPT | Performed by: FAMILY MEDICINE

## 2021-08-24 ASSESSMENT — PATIENT HEALTH QUESTIONNAIRE - PHQ9: SUM OF ALL RESPONSES TO PHQ9 QUESTIONS 1 & 2: 0

## 2021-08-24 NOTE — PROGRESS NOTES
Elise Daniels D.O.   [facility] in [address], [address]  [place] [address]  [phone]  Fax [phone]          History of Present Illness   Patient ID: Stacie Hough is a 25 y.o. female.    Subjective   EPP    HPI    Pt is here for her annual exam.   Offers no complaints and is due for updated labs.     Pt will also be seeing her gyn shortly.  --reviewed pmhx/surgical hx iweth pt.  Updated chart.                                                                                                                                                                                                               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       Past Surgical History:   Procedure Laterality Date   • MAXILLARY LE FORTE I OSTEOTOMY  12/13/2017   • WISDOM TOOTH EXTRACTION  2016       Family History   Problem Relation Age of Onset   • Hypertension Biological Father    • Hyperlipidemia Biological Father    • Diabetes Maternal Grandmother    • Hypertension Paternal Grandfather    • Arrhythmia Paternal Grandfather    • No Known Problems Biological Mother    • No Known Problems Biological Sister    • Hypertension Biological Brother    • Heart failure Biological Brother    • Heart disease Biological Brother    • Arrhythmia Paternal Grandmother    • Heart disease Paternal Grandmother        Social History     Socioeconomic History   • Marital status: Single     Spouse name: Not on file   • Number of children: Not on file   • Years of education: Not on file   • Highest education level: Not on file   Occupational History   • Occupation:    Tobacco Use   • Smoking status: Never Smoker   • Smokeless tobacco: Never Used   Vaping Use   • Vaping  Use: Never used   Substance and Sexual Activity   • Alcohol use: Yes     Comment: Socially    • Drug use: Never   • Sexual activity: Yes     Partners: Male     Birth control/protection: None   Other Topics Concern   • Not on file   Social History Narrative   • Not on file     Social Determinants of Health     Financial Resource Strain:    • Difficulty of Paying Living Expenses:    Food Insecurity:    • Worried About Running Out of Food in the Last Year:    • Ran Out of Food in the Last Year:    Transportation Needs:    • Lack of Transportation (Medical):    • Lack of Transportation (Non-Medical):    Physical Activity:    • Days of Exercise per Week:    • Minutes of Exercise per Session:    Stress:    • Feeling of Stress :    Social Connections:    • Frequency of Communication with Friends and Family:    • Frequency of Social Gatherings with Friends and Family:    • Attends [employer] Services:    • Active Member of Clubs or Organizations:    • Attends Club or Organization Meetings:    • Marital Status:    Intimate Partner Violence:    • Fear of Current or Ex-Partner:    • Emotionally Abused:    • Physically Abused:    • Sexually Abused:       Allergies and Medications     Allergies   Allergen Reactions   • No Known Allergies          Current Outpatient Medications:   •  spironolactone (ALDACTONE ORAL), Take by mouth. Under derm, Disp: , Rfl:    Review of Systems       Review of Systems   Constitutional: Negative for activity change, appetite change, fatigue and unexpected weight change.   HENT: Negative for congestion, dental problem, ear pain, hearing loss, postnasal drip, sinus pressure, sinus pain, sore throat and trouble swallowing.    Eyes: Negative for pain, discharge and visual disturbance.   Respiratory: Negative for cough, choking, chest tightness, shortness of breath and wheezing.    Cardiovascular: Negative for chest pain and palpitations.   Gastrointestinal: Negative for abdominal pain, constipation,  "diarrhea and nausea.   Endocrine: Negative for cold intolerance and heat intolerance.   Genitourinary: Negative for difficulty urinating, dysuria, frequency, menstrual problem, pelvic pain, vaginal discharge and vaginal pain.   Musculoskeletal: Negative for back pain, gait problem and neck pain.   Skin: Negative for color change and rash.   Allergic/Immunologic: Negative for food allergies.   Neurological: Negative for dizziness, weakness, light-headedness and headaches.   Hematological: Does not bruise/bleed easily.   Psychiatric/Behavioral: Negative for behavioral problems, decreased concentration, dysphoric mood, self-injury, sleep disturbance and suicidal ideas. The patient is not nervous/anxious and is not hyperactive.       Physical Examination       Objective     Vitals:    08/24/21 1450   BP: 138/88   Pulse:    Resp:    Temp:    SpO2:        Vitals:    08/24/21 1436 08/24/21 1450   BP: (!) 142/88 138/88   BP Location: Left upper arm    Patient Position: Sitting    Pulse: 92    Resp: 16    Temp: 36.7 °C (98.1 °F)    TempSrc: Temporal    SpO2: 98%    Weight: 88.9 kg (196 lb)    Height: 1.702 m (5' 7\")        Wt Readings from Last 3 Encounters:   08/24/21 88.9 kg (196 lb)   09/11/20 79.4 kg (175 lb)   01/27/20 80.4 kg (177 lb 3.2 oz)       Ht Readings from Last 3 Encounters:   08/24/21 1.702 m (5' 7\")   09/11/20 1.676 m (5' 6\")   01/27/20 1.676 m (5' 6\")       BP Readings from Last 3 Encounters:   08/24/21 138/88   09/11/20 110/74   01/27/20 116/70       Physical Exam  Vitals and nursing note reviewed.   Constitutional:       Appearance: Normal appearance. She is well-developed.   HENT:      Head: Normocephalic and atraumatic.      Right Ear: Tympanic membrane, ear canal and external ear normal.      Left Ear: Tympanic membrane, ear canal and external ear normal.      Nose: Nose normal.      Mouth/Throat:      Mouth: Mucous membranes are moist.      Pharynx: Oropharynx is clear. No oropharyngeal exudate. "   Eyes:      General:         Right eye: No discharge.         Left eye: No discharge.      Extraocular Movements: Extraocular movements intact.      Conjunctiva/sclera: Conjunctivae normal.      Pupils: Pupils are equal, round, and reactive to light.   Neck:      Thyroid: No thyromegaly.   Cardiovascular:      Rate and Rhythm: Normal rate and regular rhythm.      Heart sounds: Normal heart sounds. No murmur heard.     Pulmonary:      Effort: Pulmonary effort is normal.      Breath sounds: Normal breath sounds. No wheezing or rales.   Chest:      Chest wall: No tenderness.   Abdominal:      General: Abdomen is flat. Bowel sounds are normal.      Palpations: Abdomen is soft.      Tenderness: There is no abdominal tenderness. There is no guarding.      Hernia: No hernia is present.   Musculoskeletal:         General: No tenderness or deformity. Normal range of motion.      Cervical back: Normal range of motion and neck supple.   Lymphadenopathy:      Cervical: No cervical adenopathy.   Skin:     General: Skin is warm and dry.      Capillary Refill: Capillary refill takes less than 2 seconds.      Findings: No erythema.   Neurological:      General: No focal deficit present.      Mental Status: She is alert and oriented to person, place, and time.      Cranial Nerves: No cranial nerve deficit.      Sensory: No sensory deficit.      Deep Tendon Reflexes: Reflexes normal.   Psychiatric:         Mood and Affect: Mood normal.         Behavior: Behavior normal.         Thought Content: Thought content normal.         Judgment: Judgment normal.        Laboratory Results     Lab Results   Component Value Date    WBC 0.0 01/25/2019          Chemistry    No results found for: NA, K, CL, CO2, BUN, CREATININE, GLU No results found for: CALCIUM, ALKPHOS, AST, ALT, BILITOT         No results found for: GLUCOSE, CALCIUM, NA, K, CO2, CL, BUN, CREATININE    No results found for: CHOL  No results found for: HDL  No results found for:  LDLCALC  No results found for: TRIG  No results found for: CHOLHDL    No results found for: TSH    No results found for: HGBA1C    Lab Results   Component Value Date    HEPCAB NON-REACTIVE 09/16/2020         Immunization History   Administered Date(s) Administered   • Influenza Vaccine Quadrivalent Preservative Free 6-35 Months 11/28/2016, 11/03/2017   • Influenza, Unspecified 11/28/2016, 11/03/2017          Assessment and Plan       Assessment/Plan     Problem List Items Addressed This Visit        Other    Encounter for general adult medical examination without abnormal findings - Primary    Current Assessment & Plan     Normal Exam.  Updated interval history.  Reviewed medications, allergies, PMH,FH,alcohol/tobacco/drug use.  Diet and exercise counseling given.   -Age appropriate health Maintenance reviewed.     -She is UTD with GYN Exam  -Routine lab panel was ordered.      Counseling regarding healthy eating habits and a diet low in saturated fats               Relevant Orders    Comprehensive metabolic panel    CBC    Urinalysis with Reflex Culture    Elevated BP without diagnosis of hypertension    Current Assessment & Plan     Pt does have elevated BP on today's visit.  Advised patient to keep to low-salt diet continue exercise regimen and will repeat BPs at home.  Patient is aware if blood pressures are more than 140 /90 to notify the office--patient aware and agreeable to plan of care.         Relevant Orders    Comprehensive metabolic panel    Lipid panel    Microalbumin / creatinine urine ratio    Urinalysis with Reflex Culture          No follow-ups on file.    Orders Placed This Encounter   Procedures   • Comprehensive metabolic panel     Standing Status:   Future     Number of Occurrences:   1     Standing Expiration Date:   8/24/2022     Order Specific Question:   Release to patient     Answer:   Immediate   • Lipid panel     Standing Status:   Future     Number of Occurrences:   1     Standing  Expiration Date:   8/24/2022     Order Specific Question:   Release to patient     Answer:   Immediate   • CBC     Standing Status:   Future     Number of Occurrences:   1     Standing Expiration Date:   8/24/2022     Order Specific Question:   Release to patient     Answer:   Immediate   • Microalbumin / creatinine urine ratio     Standing Status:   Future     Number of Occurrences:   1     Standing Expiration Date:   8/24/2022     Order Specific Question:   Release to patient     Answer:   Immediate   • Urinalysis with Reflex Culture     Standing Status:   Future     Number of Occurrences:   1     Standing Expiration Date:   8/24/2022     Order Specific Question:   Release to patient     Answer:   Immediate          Elise Daniels DO  9/5/2021

## 2021-09-05 PROBLEM — R03.0 ELEVATED BP WITHOUT DIAGNOSIS OF HYPERTENSION: Status: ACTIVE | Noted: 2021-09-05

## 2021-09-05 ASSESSMENT — ENCOUNTER SYMPTOMS
PALPITATIONS: 0
SINUS PAIN: 0
DIFFICULTY URINATING: 0
APPETITE CHANGE: 0
CHEST TIGHTNESS: 0
EYE PAIN: 0
SINUS PRESSURE: 0
COUGH: 0
DYSPHORIC MOOD: 0
FREQUENCY: 0
ACTIVITY CHANGE: 0
HEADACHES: 0
CHOKING: 0
UNEXPECTED WEIGHT CHANGE: 0
DECREASED CONCENTRATION: 0
NECK PAIN: 0
NAUSEA: 0
SLEEP DISTURBANCE: 0
SHORTNESS OF BREATH: 0
WEAKNESS: 0
NERVOUS/ANXIOUS: 0
CONSTIPATION: 0
EYE DISCHARGE: 0
ABDOMINAL PAIN: 0
BRUISES/BLEEDS EASILY: 0
SORE THROAT: 0
DIARRHEA: 0
WHEEZING: 0
HYPERACTIVE: 0
FATIGUE: 0
BACK PAIN: 0
DIZZINESS: 0
DYSURIA: 0
LIGHT-HEADEDNESS: 0
TROUBLE SWALLOWING: 0
COLOR CHANGE: 0

## 2021-09-05 NOTE — ASSESSMENT & PLAN NOTE
Normal Exam.  Updated interval history.  Reviewed medications, allergies, PMH,FH,alcohol/tobacco/drug use.  Diet and exercise counseling given.   -Age appropriate health Maintenance reviewed.     -She is UTD with GYN Exam  -Routine lab panel was ordered.      Counseling regarding healthy eating habits and a diet low in saturated fats

## 2021-09-05 NOTE — ASSESSMENT & PLAN NOTE
Pt does have elevated BP on today's visit.  Advised patient to keep to low-salt diet continue exercise regimen and will repeat BPs at home.  Patient is aware if blood pressures are more than 140 /90 to notify the office--patient aware and agreeable to plan of care.

## 2021-10-02 LAB
ALBUMIN SERPL-MCNC: 4.3 G/DL (ref 3.6–5.1)
ALBUMIN/CREAT UR: 3 MCG/MG CREAT
ALBUMIN/GLOB SERPL: 1.1 (CALC) (ref 1–2.5)
ALP SERPL-CCNC: 53 U/L (ref 31–125)
ALT SERPL-CCNC: 17 U/L (ref 6–29)
APPEARANCE UR: CLEAR
AST SERPL-CCNC: 17 U/L (ref 10–30)
BACTERIA #/AREA URNS HPF: NORMAL /HPF
BACTERIA UR CULT: NORMAL
BILIRUB SERPL-MCNC: 0.6 MG/DL (ref 0.2–1.2)
BILIRUB UR QL STRIP: NEGATIVE
BUN SERPL-MCNC: 15 MG/DL (ref 7–25)
BUN/CREAT SERPL: ABNORMAL (CALC) (ref 6–22)
CALCIUM SERPL-MCNC: 9.5 MG/DL (ref 8.6–10.2)
CHLORIDE SERPL-SCNC: 103 MMOL/L (ref 98–110)
CHOLEST SERPL-MCNC: 214 MG/DL
CHOLEST/HDLC SERPL: 3.2 (CALC)
CO2 SERPL-SCNC: 29 MMOL/L (ref 20–32)
COLOR UR: YELLOW
CREAT SERPL-MCNC: 0.78 MG/DL (ref 0.5–1.1)
CREAT UR-MCNC: 188 MG/DL (ref 20–275)
ERYTHROCYTE [DISTWIDTH] IN BLOOD BY AUTOMATED COUNT: 12.4 % (ref 11–15)
GLOBULIN SER CALC-MCNC: 3.8 G/DL (CALC) (ref 1.9–3.7)
GLUCOSE SERPL-MCNC: 95 MG/DL (ref 65–99)
GLUCOSE UR QL STRIP: NEGATIVE
HCT VFR BLD AUTO: 44.2 % (ref 35–45)
HDLC SERPL-MCNC: 66 MG/DL
HGB BLD-MCNC: 14.4 G/DL (ref 11.7–15.5)
HGB UR QL STRIP: NEGATIVE
HYALINE CASTS #/AREA URNS LPF: NORMAL /LPF
KETONES UR QL STRIP: NEGATIVE
LDLC SERPL CALC-MCNC: 133 MG/DL (CALC)
LEUKOCYTE ESTERASE UR QL STRIP: NEGATIVE
MCH RBC QN AUTO: 29.6 PG (ref 27–33)
MCHC RBC AUTO-ENTMCNC: 32.6 G/DL (ref 32–36)
MCV RBC AUTO: 90.8 FL (ref 80–100)
MICROALBUMIN UR-MCNC: 0.5 MG/DL
NITRITE UR QL STRIP: NEGATIVE
NONHDLC SERPL-MCNC: 148 MG/DL (CALC)
PH UR STRIP: NORMAL [PH] (ref 5–8)
PLATELET # BLD AUTO: 375 THOUSAND/UL (ref 140–400)
PMV BLD REES-ECKER: 10.1 FL (ref 7.5–12.5)
POTASSIUM SERPL-SCNC: 4.8 MMOL/L (ref 3.5–5.3)
PROT SERPL-MCNC: 8.1 G/DL (ref 6.1–8.1)
PROT UR QL STRIP: NEGATIVE
QUEST EGFR AFRICAN AMERICAN: 120 ML/MIN/1.73M2
QUEST EGFR NON-AFR. AMERICAN: 103 ML/MIN/1.73M2
RBC # BLD AUTO: 4.87 MILLION/UL (ref 3.8–5.1)
RBC #/AREA URNS HPF: NORMAL /HPF
SODIUM SERPL-SCNC: 136 MMOL/L (ref 135–146)
SP GR UR STRIP: 1.03 (ref 1–1.03)
SQUAMOUS #/AREA URNS HPF: NORMAL /HPF
TRIGL SERPL-MCNC: 59 MG/DL
WBC # BLD AUTO: 6 THOUSAND/UL (ref 3.8–10.8)
WBC #/AREA URNS HPF: NORMAL /HPF

## 2022-05-04 DIAGNOSIS — Z11.3 SCREEN FOR STD (SEXUALLY TRANSMITTED DISEASE): Primary | ICD-10-CM

## 2022-05-09 DIAGNOSIS — Z11.3 SCREEN FOR STD (SEXUALLY TRANSMITTED DISEASE): Primary | ICD-10-CM

## 2022-05-13 ENCOUNTER — OFFICE VISIT (OUTPATIENT)
Dept: OBSTETRICS AND GYNECOLOGY | Facility: CLINIC | Age: 27
End: 2022-05-13
Payer: COMMERCIAL

## 2022-05-13 VITALS
HEIGHT: 67 IN | BODY MASS INDEX: 30.61 KG/M2 | SYSTOLIC BLOOD PRESSURE: 120 MMHG | DIASTOLIC BLOOD PRESSURE: 78 MMHG | WEIGHT: 195 LBS

## 2022-05-13 DIAGNOSIS — Z11.3 SCREEN FOR STD (SEXUALLY TRANSMITTED DISEASE): Primary | ICD-10-CM

## 2022-05-13 DIAGNOSIS — Z01.419 WOMEN'S ANNUAL ROUTINE GYNECOLOGICAL EXAMINATION: ICD-10-CM

## 2022-05-13 PROCEDURE — 99395 PREV VISIT EST AGE 18-39: CPT | Performed by: OBSTETRICS & GYNECOLOGY

## 2022-05-13 PROCEDURE — 3008F BODY MASS INDEX DOCD: CPT | Performed by: OBSTETRICS & GYNECOLOGY

## 2022-05-13 RX ORDER — ETONOGESTREL AND ETHINYL ESTRADIOL VAGINAL RING .015; .12 MG/D; MG/D
RING VAGINAL
Qty: 3 EACH | Refills: 3 | Status: SHIPPED | OUTPATIENT
Start: 2022-05-13 | End: 2022-12-13 | Stop reason: SDUPTHER

## 2022-05-13 NOTE — PROGRESS NOTES
"2022  Stacie Hough is a 26 y.o. female who presents for annual exam.   Periods are regular every 28-30 days, lasting 3 days. Dysmenorrhea:none. Cyclic symptoms include none. No intermenstrual bleeding, spotting, or discharge.    The patient is sexually active. GYN screening history: last pap: was normal. Domestic violence: no.     Current contraception: none, uses Nuvaring on and off  History of abnormal Pap smear: no  Last pap: , normal[place]    Regular self breast exam: yes        Family history of breast cancer: yes - father's sister  Family history of uterine or ovarian cancer: no    She has never had a colonoscopy.    History of abnormal lipids: no  She does wear sunscreen.  She does wear her seatbelt.  Gardasil: has not received the series    Patient was exposed to use tampon.  She had STD testing done earlier this week which was all negative.  She desires a repeat.    Menstrual History:  OB History        0    Para   0    Term   0       0    AB   0    Living   0       SAB   0    IAB   0    Ectopic   0    Multiple   0    Live Births   0                Patient's last menstrual period was 2022.         Review of Systems and Physical Exam  The following have been reviewed and updated as appropriate in this visit:  Allergies  Meds  Problems         Visit Vitals  /78 (BP Location: Right upper arm, Patient Position: Sitting)   Ht 1.702 m (5' 7\")   Wt 88.5 kg (195 lb)   LMP 2022   BMI 30.54 kg/m²     HPI  Review of Systems  Physical Exam  Constitutional:       Appearance: She is well-developed.   Genitourinary:      Pelvic exam was performed with patient in the lithotomy position.      Urethra, vagina, cervix, uterus and urethral meatus normal.      No labial rash or lesion.      There is no tenderness or lesion on the right labia.      There is no tenderness on the left labia.    No lesions in the vagina.      No vaginal discharge, " tenderness or bleeding.      No cervical polyp.      Uterus is mobile.      Uterus is not enlarged or tender.      Uterus is regular.      No right or left adnexal mass present.        Right Adnexa: not tender, not full and no mass present.     Left Adnexa: not tender, not full and no mass present.Pelvic exam was performed with patient in lithotomy exam position.     External female genitalia normal. No signs of erythema. There is no lesion or tenderness on the right external genitalia. There is no tenderness on the left external genitalia.   Urethral meatus normal.   Urethra normal.   Normal bladder.   Vagina normal.   Cervix exam normal.  Uterus is normal. Uterus is mobile. Uterus is not enlarged or tender. Uterine contour is regular. HENT:      Head: Normocephalic and atraumatic.   Neck:      Thyroid: No thyromegaly.   Cardiovascular:      Rate and Rhythm: Normal rate and regular rhythm.   Pulmonary:      Effort: Pulmonary effort is normal.      Breath sounds: Normal breath sounds.   Chest:   Breasts: Breasts are symmetrical.      Right: No swelling, bleeding, inverted nipple, mass, nipple discharge, skin change or tenderness.      Left: No swelling, bleeding, inverted nipple, mass, nipple discharge, skin change or tenderness.       Abdominal:      General: Bowel sounds are normal. There is no distension.      Palpations: Abdomen is soft. There is no mass.      Tenderness: There is no abdominal tenderness. There is no right CVA tenderness, left CVA tenderness, guarding or rebound.      Hernia: No hernia is present.   Musculoskeletal:         General: Normal range of motion.      Cervical back: Neck supple.   Lymphadenopathy:      Cervical: No cervical adenopathy.   Neurological:      Mental Status: She is alert and oriented to person, place, and time.   Skin:     General: Skin is warm.   Psychiatric:         Behavior: Behavior normal.   Vitals reviewed. Exam conducted with a chaperone present.         Diagnoses  and all orders for this visit:    Screen for STD (sexually transmitted disease)  -     HIV 1,2 AB P24 AG  -     Syphilis Antibodies  -     Hepatitis C antibody; Future  -     Hepatitis B surface antigen; Future  -     Herpes Simplex Virus 1/2 (IgG) w Reflex to HSV2    Women's annual routine gynecological examination  -     Cytology, Thinprep Pap    Other orders  -     etonogestreL-ethinyl estradioL (NUVARING) 0.12-0.015 mg/24 hr vaginal ring; Insert vaginally and leave in for 3 consecutive weeks, then remove for 1 week. Nuvaring, brand name only    .  Chlamydia specimen.  GC specimen.  Pap smear..  Contraception: will continue current method  General Health Maintenance:pap obtained per pt request.  Pap smear recommendations reviewed.  She will do another screen for STDs before she leaves to go back to [place].  No follow-ups on file.  Sherry Prince MD

## 2022-05-17 LAB
HBV SURFACE AG SERPL QL IA: NORMAL
HCV AB S/CO SERPL IA: 0.09
HCV AB SERPL QL IA: NORMAL
HIV 1+2 AB+HIV1 P24 AG SERPL QL IA: NORMAL
HSV1 IGG SER IA-ACNC: <0.9 INDEX
HSV2 IGG SER IA-ACNC: <0.9 INDEX
T PALLIDUM AB SER QL IA: NEGATIVE

## 2022-05-18 LAB
C TRACH RRNA SPEC QL NAA+PROBE: NOT DETECTED
CLINICAL INFO: NORMAL
CYTO CVX: NORMAL
CYTOLOGIST CVX/VAG CYTO: NORMAL
CYTOLOGY CMNT CVX/VAG CYTO-IMP: NORMAL
DATE OF PREVIOUS PAP: NORMAL
DATE PREVIOUS BX: NORMAL
LMP START DATE: NORMAL
MICROORGANISM CVX/VAG CYTO: NORMAL
N GONORRHOEA RRNA SPEC QL NAA+PROBE: NOT DETECTED
QST (ALWAYS MESSAGE): NORMAL
QUEST COMMENT (PAP): NORMAL
SPECIMEN SOURCE CVX/VAG CYTO: NORMAL
STAT OF ADQ CVX/VAG CYTO-IMP: NORMAL
T VAGINALIS RRNA SPEC QL NAA+PROBE: NOT DETECTED

## 2022-06-08 RX ORDER — FLUCONAZOLE 150 MG/1
150 TABLET ORAL ONCE
Qty: 1 TABLET | Refills: 0 | Status: SHIPPED | OUTPATIENT
Start: 2022-06-08 | End: 2022-06-08

## 2022-06-23 LAB
HBV SURFACE AG SERPL QL IA: NORMAL
HCV AB S/CO SERPL IA: 0.25
HCV AB SERPL QL IA: NORMAL
HIV 1+2 AB+HIV1 P24 AG SERPL QL IA: NORMAL
HSV1 IGG SER IA-ACNC: <0.9 INDEX
HSV2 IGG SER IA-ACNC: <0.9 INDEX
T PALLIDUM AB SER QL IA: NEGATIVE

## 2022-07-15 ENCOUNTER — OFFICE VISIT (OUTPATIENT)
Dept: OBSTETRICS AND GYNECOLOGY | Facility: CLINIC | Age: 27
End: 2022-07-15
Payer: COMMERCIAL

## 2022-07-15 ENCOUNTER — TELEPHONE (OUTPATIENT)
Dept: OBSTETRICS AND GYNECOLOGY | Facility: CLINIC | Age: 27
End: 2022-07-15
Payer: COMMERCIAL

## 2022-07-15 VITALS
SYSTOLIC BLOOD PRESSURE: 124 MMHG | DIASTOLIC BLOOD PRESSURE: 80 MMHG | HEIGHT: 67 IN | BODY MASS INDEX: 31.86 KG/M2 | WEIGHT: 203 LBS

## 2022-07-15 DIAGNOSIS — Z11.3 SCREEN FOR STD (SEXUALLY TRANSMITTED DISEASE): Primary | ICD-10-CM

## 2022-07-15 DIAGNOSIS — B37.31 YEAST INFECTION INVOLVING THE VAGINA AND SURROUNDING AREA: ICD-10-CM

## 2022-07-15 DIAGNOSIS — N89.8 VAGINAL DISCHARGE: ICD-10-CM

## 2022-07-15 PROCEDURE — 99214 OFFICE O/P EST MOD 30 MIN: CPT | Performed by: OBSTETRICS & GYNECOLOGY

## 2022-07-15 PROCEDURE — 3008F BODY MASS INDEX DOCD: CPT | Performed by: OBSTETRICS & GYNECOLOGY

## 2022-07-15 RX ORDER — MICONAZOLE NITRATE 2 %
1 CREAM WITH APPLICATOR VAGINAL NIGHTLY
Qty: 45 G | Refills: 1 | Status: SHIPPED | OUTPATIENT
Start: 2022-07-15 | End: 2022-07-22

## 2022-07-15 ASSESSMENT — ENCOUNTER SYMPTOMS
CHILLS: 0
ABDOMINAL PAIN: 0
VOMITING: 0
DIARRHEA: 0
FEVER: 0
DIFFICULTY URINATING: 0
FREQUENCY: 0

## 2022-07-15 NOTE — TELEPHONE ENCOUNTER
Pt was just seen in office this morning and an Rx was sent over for the nuvaring -- pt is requesting the brand name because she has had issues with the generic. Pt's insurance is requesting a prior auth for this request. Please advise. Thank you

## 2022-07-15 NOTE — PROGRESS NOTES
HPI  Pt has a vaginal discharge.  There is itching and and odor.  No unusual bleeding.  It is a white discharge, a lot in amount.  Started a few months ago.  It was worse after sex in June, with a new partner.  No OTC treatments.  Yeast on Pap in May.  Doesn't usually treat.  I prescribed Flagyl and Diflucan earlier this week, and she hasn't taken it yet.    Diflucan has helped in the past.  Over the past 4 years, she has needed yeast treatment about 7 times.    She is due for her period.      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 Nuvaring, brand name only 3 each 3   • metroNIDAZOLE (FLAGYL) 500 mg tablet Take 1 tablet (500 mg total) by mouth 2 (two) times a day for 7 days. 14 tablet 0   • spironolactone (ALDACTONE ORAL) Take by mouth. Under derm       No current facility-administered medications on file prior to visit.       Social History     Socioeconomic History   • Marital status: Single     Spouse name: Not on file   • Number of children: Not on file   • Years of education: Not on file   • Highest education level: Not on file   Occupational History   • Occupation:    Tobacco Use   • Smoking status: Never Smoker   • Smokeless tobacco: Never Used   Vaping Use   • Vaping Use: Never used   Substance and Sexual Activity   • Alcohol use: Yes     Comment: Socially    • Drug use: Never   • Sexual activity: Yes     Partners: Male     Birth control/protection: None   Other Topics Concern   • Not on file   Social History Narrative   • Not on file     Social  Determinants of Health     Financial Resource Strain: Not on file   Food Insecurity: Not on file   Transportation Needs: Not on file   Physical Activity: Not on file   Stress: Not on file   Social Connections: Not on file   Intimate Partner Violence: Not on file   Housing Stability: Not on file       Family History   Problem Relation Age of Onset   • Hypertension Paternal Grandfather    • Arrhythmia Paternal Grandfather    • Arrhythmia Paternal Grandmother    • Heart disease Paternal Grandmother    • Diabetes Maternal Grandmother    • Diabetes Biological Father    • Hypertension Biological Father    • Hyperlipidemia Biological Father    • No Known Problems Biological Mother    • Hypertension Biological Brother    • Heart failure Biological Brother    • Heart disease Biological Brother    • No Known Problems Biological Sister    • Breast cancer Father's Sister        Review of Systems   Constitutional: Negative for chills and fever.   Gastrointestinal: Negative for abdominal pain, diarrhea and vomiting.   Genitourinary: Positive for vaginal discharge. Negative for difficulty urinating and frequency.       Physical Exam  Constitutional:       Appearance: Normal appearance. She is well-developed.   Genitourinary:      Pelvic exam was performed with patient in the lithotomy position.      Urethra, cervix and urethral meatus normal.      No labial rash.      Vaginal discharge present.      No vaginal erythema or atrophy.      Uterus is not enlarged or tender.      No right or left adnexal mass present.        Right Adnexa: not tender and no mass present.     Left Adnexa: not tender and no mass present.   Genitourinary Comments: Menses in vault   Pelvic exam was performed with patient in lithotomy exam position.     External female genitalia normal. No signs of erythema or atrophy.   Urethral meatus normal.   Urethra normal.   Normal bladder. There is no vaginal atrophy.   Cervix exam normal.Uterus is not enlarged or  tender. Eyes:      Conjunctiva/sclera: Conjunctivae normal.   Abdominal:      General: There is no distension.      Palpations: Abdomen is soft. There is no mass.      Tenderness: There is no abdominal tenderness. There is no guarding or rebound.      Hernia: No hernia is present.   Neurological:      Mental Status: She is alert.   Vitals reviewed. Exam conducted with a chaperone present.           Assessment/Plan     Stacie was seen today for sti/std screening.    Diagnoses and all orders for this visit:    Screen for STD (sexually transmitted disease)  -     RPR; Future  -     HIV 1,2 AB P24 AG; Future  -     Hepatitis B surface antigen; Future  -     Hepatitis C antibody; Future  -     SURESWAB(R) ADVANCED VAGINITIS PLUS, TMA  -     Herpes Simplex Virus 1/2 (IgG) w Reflex to HSV2  -     RPR  -     HIV 1,2 AB P24 AG  -     Hepatitis B surface antigen  -     Hepatitis C antibody    Vaginal discharge  -     SURESWAB(R) ADVANCED VAGINITIS PLUS, TMA    Other orders  -     miconazole (MICONAZOLE-7) 2 % vaginal cream; Insert 1 applicator into the vagina nightly for 7 days.      I instructed the patient to take the Diflucan that I prescribed her earlier this week.  I told her she could hold off on the Flagyl until we get the swab results back.  It seems like sex can be a trigger for yeast infections.  Have also prescribed her miconazole cream.  I explained that she could use 1 days worth after intercourse as a preventative measure to see if that helps.    STD serologies were ordered as well.    I will contact her with results.

## 2022-07-16 LAB
HBV SURFACE AG SERPL QL IA: NORMAL
HCV AB S/CO SERPL IA: 0.12
HCV AB SERPL QL IA: NORMAL
HIV 1+2 AB+HIV1 P24 AG SERPL QL IA: NORMAL
RPR SER QL: NORMAL

## 2022-07-17 LAB
BV BACTERIA RRNA VAG QL NAA+PROBE: POSITIVE
C GLABRATA RNA VAG QL NAA+PROBE: NOT DETECTED
C TRACH RRNA SPEC QL NAA+PROBE: NOT DETECTED
CANDIDA RRNA VAG QL PROBE: NOT DETECTED
N GONORRHOEA RRNA SPEC QL NAA+PROBE: NOT DETECTED
T VAGINALIS RRNA SPEC QL NAA+PROBE: NOT DETECTED

## 2022-12-13 RX ORDER — ETONOGESTREL AND ETHINYL ESTRADIOL VAGINAL RING .015; .12 MG/D; MG/D
RING VAGINAL
Qty: 3 EACH | Refills: 2 | Status: SHIPPED | OUTPATIENT
Start: 2022-12-13 | End: 2023-07-31 | Stop reason: ALTCHOICE

## 2023-02-23 ENCOUNTER — OFFICE VISIT (OUTPATIENT)
Dept: OBSTETRICS AND GYNECOLOGY | Facility: CLINIC | Age: 28
End: 2023-02-23
Payer: COMMERCIAL

## 2023-02-23 VITALS
WEIGHT: 196 LBS | BODY MASS INDEX: 31.5 KG/M2 | SYSTOLIC BLOOD PRESSURE: 122 MMHG | HEIGHT: 66 IN | DIASTOLIC BLOOD PRESSURE: 72 MMHG

## 2023-02-23 DIAGNOSIS — N90.89 VULVAR LESION: ICD-10-CM

## 2023-02-23 DIAGNOSIS — Z11.3 SCREEN FOR STD (SEXUALLY TRANSMITTED DISEASE): Primary | ICD-10-CM

## 2023-02-23 PROCEDURE — 3008F BODY MASS INDEX DOCD: CPT | Performed by: OBSTETRICS & GYNECOLOGY

## 2023-02-23 PROCEDURE — 99213 OFFICE O/P EST LOW 20 MIN: CPT | Performed by: OBSTETRICS & GYNECOLOGY

## 2023-02-23 ASSESSMENT — ENCOUNTER SYMPTOMS
ABDOMINAL PAIN: 0
FEVER: 0
FREQUENCY: 0
DIARRHEA: 0
CHILLS: 0
VOMITING: 0
DIFFICULTY URINATING: 0

## 2023-02-23 NOTE — PROGRESS NOTES
HPI    Patient presents today for STD screening.  She recently had labs done all which were negative for herpes, hepatitis B and C, HIV and syphilis.    She also has concerns about a spot on her perineum.  This popped up about a month ago.  She originally felt 3 spots.    She also has questions regarding future fertility planning.  She notes when she is not on the NuvaRing she does get regular periods.  She has taken the NuvaRing out in about October and has not used it since.    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 Under derm     • etonogestreL-ethinyl estradioL (NUVARING) 0.12-0.015 mg/24 hr vaginal ring Insert vaginally and leave in for 3 consecutive weeks, then remove for 1 week. Nuvaring, brand name only (Patient not taking: Reported on 2/23/2023) 3 each 2     No current facility-administered medications on file prior to visit.       Social History     Socioeconomic History   • Marital status: Single     Spouse name: Not on file   • Number of children: Not on file   • Years of education: Not on file   • Highest education level: Not on file   Occupational History   • Occupation:    Tobacco Use   • Smoking status: Never   • Smokeless tobacco: Never   Vaping Use   • Vaping Use: Never used   Substance and Sexual Activity   • Alcohol use: Yes     Comment: Socially    • Drug use: Never   • Sexual activity: Yes     Partners: Male     Birth control/protection: None   Other Topics Concern   • Not on file   Social History Narrative   • Not on file     Social Determinants of Health     Financial Resource Strain: Not on file   Food Insecurity: Not on file    Transportation Needs: Not on file   Physical Activity: Not on file   Stress: Not on file   Social Connections: Not on file   Intimate Partner Violence: Not on file   Housing Stability: Not on file       Family History   Problem Relation Age of Onset   • Hypertension Paternal Grandfather    • Arrhythmia Paternal Grandfather    • Arrhythmia Paternal Grandmother    • Heart disease Paternal Grandmother    • Diabetes Maternal Grandmother    • Diabetes Biological Father    • Hypertension Biological Father    • Hyperlipidemia Biological Father    • No Known Problems Biological Mother    • Hypertension Biological Brother    • Heart failure Biological Brother    • Heart disease Biological Brother    • No Known Problems Biological Sister    • Breast cancer Father's Sister        Review of Systems   Constitutional: Negative for chills and fever.   Gastrointestinal: Negative for abdominal pain, diarrhea and vomiting.   Genitourinary: Negative for difficulty urinating, frequency and vaginal discharge.       Physical Exam  Constitutional:       Appearance: Normal appearance. She is well-developed.   Genitourinary:      Pelvic exam was performed with patient in the lithotomy position.      Urethra, cervix and urethral meatus normal.      No labial rash.      There is lesion on the right labia.      No vaginal discharge, erythema or atrophy.      Uterus is not enlarged or tender.      No right or left adnexal mass present.        Right Adnexa: not tender and no mass present.     Left Adnexa: not tender and no mass present.   Genitourinary Comments: Small 2 mm flesh-colored papule very minimally raised   Pelvic exam was performed with patient in lithotomy exam position.     External female genitalia normal. No signs of erythema or atrophy. There is lesion on the right external genitalia.   Urethral meatus normal.   Urethra normal.   Normal bladder. There is no vaginal atrophy.   Cervix exam normal.Uterus is not enlarged or tender.        Abdominal:      General: There is no distension.      Palpations: Abdomen is soft. There is no mass.      Tenderness: There is no abdominal tenderness. There is no guarding or rebound.      Hernia: No hernia is present.   Neurological:      Mental Status: She is alert.   Vitals reviewed. Exam conducted with a chaperone present.           Assessment/Plan     Diagnoses and all orders for this visit:    Screen for STD (sexually transmitted disease)  -     SURESWAB(R) ADVANCED VAGINITIS PLUS, TMA    Vulvar lesion      Swab was obtained.    I see a very small flesh-colored lump at the base of the introitus on the right side.  This does not appear to be a genital wart.  I am unsure exactly what this is but it has an overall benign appearance.  I encouraged the patient to continue to watch it.  If it changes in any way she will let me know.    I explained to the patient that I feel, in general, patients should always be proactive about fertility as we cannot see what the future holds.  I am unsure if she needs to do something right at this moment in time.  I did however give her the names of 2 infertility offices in the area in case she desires egg harvesting.  I explained to her that if she not actively trying to conceive in the next 2 or 3 years maybe she wants to give that a little bit more thought.

## 2023-02-24 LAB
BV BACTERIA RRNA VAG QL NAA+PROBE: NEGATIVE
C GLABRATA RNA VAG QL NAA+PROBE: NOT DETECTED
C TRACH RRNA SPEC QL NAA+PROBE: NOT DETECTED
CANDIDA RRNA VAG QL PROBE: DETECTED
N GONORRHOEA RRNA SPEC QL NAA+PROBE: NOT DETECTED
T VAGINALIS RRNA SPEC QL NAA+PROBE: NOT DETECTED

## 2023-02-27 RX ORDER — FLUCONAZOLE 150 MG/1
150 TABLET ORAL ONCE
Qty: 2 TABLET | Refills: 0 | Status: SHIPPED | OUTPATIENT
Start: 2023-02-27 | End: 2023-02-27

## 2023-03-01 RX ORDER — FLUCONAZOLE 150 MG/1
150 TABLET ORAL ONCE
Qty: 2 TABLET | Refills: 0 | Status: SHIPPED | OUTPATIENT
Start: 2023-03-01 | End: 2023-03-01

## 2023-03-01 NOTE — PROCEDURES
IUD Removal Ambulatory  Date/Time: 7/3/2018 11:20 AM  Performed by: MARIANA NEAL  Authorized by: MARIANA NEAL     Consent:     Consent obtained:  Verbal and written    Consent given by:  Patient    Procedure risks and benefits discussed: yes      Patient questions answered: yes      Patient agrees, verbalizes understanding, and wants to proceed: yes    Procedure:     Removed with no complications: yes      Removal due to mechanical complications of IUD: no      Removal due to infection and inflammatory reaction: no    Comments:      A speculum was placed in the vagina.  The IUD strings were visualized.  These were grasped with Ring Forceps, and easily removed.  There was minimal pain and bleeding.  The patient tolerated the procedure well.          
Yes

## 2023-03-24 ENCOUNTER — APPOINTMENT (RX ONLY)
Dept: URBAN - METROPOLITAN AREA CLINIC 341 | Facility: CLINIC | Age: 28
Setting detail: DERMATOLOGY
End: 2023-03-24

## 2023-03-24 DIAGNOSIS — L74.51 PRIMARY FOCAL HYPERHIDROSIS: ICD-10-CM | Status: INADEQUATELY CONTROLLED

## 2023-03-24 DIAGNOSIS — L70.0 ACNE VULGARIS: ICD-10-CM | Status: INADEQUATELY CONTROLLED

## 2023-03-24 DIAGNOSIS — L81.4 OTHER MELANIN HYPERPIGMENTATION: ICD-10-CM | Status: INADEQUATELY CONTROLLED

## 2023-03-24 PROBLEM — L74.510 PRIMARY FOCAL HYPERHIDROSIS, AXILLA: Status: ACTIVE | Noted: 2023-03-24

## 2023-03-24 PROCEDURE — ? COUNSELING

## 2023-03-24 PROCEDURE — 99204 OFFICE O/P NEW MOD 45 MIN: CPT

## 2023-03-24 PROCEDURE — ? PRESCRIPTION

## 2023-03-24 PROCEDURE — ? PRESCRIPTION MEDICATION MANAGEMENT

## 2023-03-24 PROCEDURE — ? DEFER

## 2023-03-24 RX ORDER — CLINDAMYCIN PHOSPHATE 10 MG/G
GEL TOPICAL
Qty: 60 | Refills: 4 | Status: ERX | COMMUNITY
Start: 2023-03-24

## 2023-03-24 RX ORDER — HYDROQUINONE 4 %
CREAM (GRAM) TOPICAL
Qty: 30 | Refills: 1 | Status: ERX | COMMUNITY
Start: 2023-03-24

## 2023-03-24 RX ORDER — TAZAROTENE 0.45 MG/G
LOTION TOPICAL
Qty: 45 | Refills: 3 | Status: ERX | COMMUNITY
Start: 2023-03-24

## 2023-03-24 RX ADMIN — TAZAROTENE: 0.45 LOTION TOPICAL at 00:00

## 2023-03-24 RX ADMIN — CLINDAMYCIN PHOSPHATE: 10 GEL TOPICAL at 00:00

## 2023-03-24 RX ADMIN — Medication: at 00:00

## 2023-03-24 ASSESSMENT — LOCATION DETAILED DESCRIPTION DERM
LOCATION DETAILED: LEFT AXILLARY VAULT
LOCATION DETAILED: RIGHT CENTRAL MALAR CHEEK
LOCATION DETAILED: RIGHT INFERIOR CENTRAL MALAR CHEEK
LOCATION DETAILED: RIGHT AXILLARY VAULT
LOCATION DETAILED: LEFT INFERIOR CENTRAL MALAR CHEEK

## 2023-03-24 ASSESSMENT — LOCATION SIMPLE DESCRIPTION DERM
LOCATION SIMPLE: LEFT AXILLARY VAULT
LOCATION SIMPLE: LEFT CHEEK
LOCATION SIMPLE: RIGHT CHEEK
LOCATION SIMPLE: RIGHT AXILLARY VAULT

## 2023-03-24 ASSESSMENT — LOCATION ZONE DERM
LOCATION ZONE: AXILLAE
LOCATION ZONE: FACE

## 2023-03-24 NOTE — PROCEDURE: COUNSELING
Tazorac Pregnancy And Lactation Text: This medication is not safe during pregnancy. It is unknown if this medication is excreted in breast milk.
Detail Level: Simple
Erythromycin Pregnancy And Lactation Text: This medication is Pregnancy Category B and is considered safe during pregnancy. It is also excreted in breast milk.
Topical Retinoid Pregnancy And Lactation Text: This medication is Pregnancy Category C. It is unknown if this medication is excreted in breast milk.
Azithromycin Counseling:  I discussed with the patient the risks of azithromycin including but not limited to GI upset, allergic reaction, drug rash, diarrhea, and yeast infections.
Spironolactone Counseling: Patient advised regarding risks of diarrhea, abdominal pain, hyperkalemia, birth defects (for female patients), liver toxicity and renal toxicity. The patient may need blood work to monitor liver and kidney function and potassium levels while on therapy. The patient verbalized understanding of the proper use and possible adverse effects of spironolactone.  All of the patient's questions and concerns were addressed.
Doxycycline Pregnancy And Lactation Text: This medication is Pregnancy Category D and not consider safe during pregnancy. It is also excreted in breast milk but is considered safe for shorter treatment courses.
Birth Control Pills Counseling: Birth Control Pill Counseling: I discussed with the patient the potential side effects of OCPs including but not limited to increased risk of stroke, heart attack, thrombophlebitis, deep venous thrombosis, hepatic adenomas, breast changes, GI upset, headaches, and depression.  The patient verbalized understanding of the proper use and possible adverse effects of OCPs. All of the patient's questions and concerns were addressed.
Isotretinoin Pregnancy And Lactation Text: This medication is Pregnancy Category X and is considered extremely dangerous during pregnancy. It is unknown if it is excreted in breast milk.
Aklief counseling:  Patient advised to apply a pea-sized amount only at bedtime and wait 30 minutes after washing their face before applying.  If too drying, patient may add a non-comedogenic moisturizer.  The most commonly reported side effects including irritation, redness, scaling, dryness, stinging, burning, itching, and increased risk of sunburn.  The patient verbalized understanding of the proper use and possible adverse effects of retinoids.  All of the patient's questions and concerns were addressed.
Bactrim Counseling:  I discussed with the patient the risks of sulfa antibiotics including but not limited to GI upset, allergic reaction, drug rash, diarrhea, dizziness, photosensitivity, and yeast infections.  Rarely, more serious reactions can occur including but not limited to aplastic anemia, agranulocytosis, methemoglobinemia, blood dyscrasias, liver or kidney failure, lung infiltrates or desquamative/blistering drug rashes.
Tetracycline Counseling: Patient counseled regarding possible photosensitivity and increased risk for sunburn.  Patient instructed to avoid sunlight, if possible.  When exposed to sunlight, patients should wear protective clothing, sunglasses, and sunscreen.  The patient was instructed to call the office immediately if the following severe adverse effects occur:  hearing changes, easy bruising/bleeding, severe headache, or vision changes.  The patient verbalized understanding of the proper use and possible adverse effects of tetracycline.  All of the patient's questions and concerns were addressed. Patient understands to avoid pregnancy while on therapy due to potential birth defects.
Include Pregnancy/Lactation Warning?: No
Minocycline Counseling: Patient advised regarding possible photosensitivity and discoloration of the teeth, skin, lips, tongue and gums.  Patient instructed to avoid sunlight, if possible.  When exposed to sunlight, patients should wear protective clothing, sunglasses, and sunscreen.  The patient was instructed to call the office immediately if the following severe adverse effects occur:  hearing changes, easy bruising/bleeding, severe headache, or vision changes.  The patient verbalized understanding of the proper use and possible adverse effects of minocycline.  All of the patient's questions and concerns were addressed.
Dapsone Pregnancy And Lactation Text: This medication is Pregnancy Category C and is not considered safe during pregnancy or breast feeding.
Topical Sulfur Applications Counseling: Topical Sulfur Counseling: Patient counseled that this medication may cause skin irritation or allergic reactions.  In the event of skin irritation, the patient was advised to reduce the amount of the drug applied or use it less frequently.   The patient verbalized understanding of the proper use and possible adverse effects of topical sulfur application.  All of the patient's questions and concerns were addressed.
Azelaic Acid Pregnancy And Lactation Text: This medication is considered safe during pregnancy and breast feeding.
High Dose Vitamin A Counseling: Side effects reviewed, pt to contact office should one occur.
Aklief Pregnancy And Lactation Text: It is unknown if this medication is safe to use during pregnancy.  It is unknown if this medication is excreted in breast milk.  Breastfeeding women should use the topical cream on the smallest area of the skin for the shortest time needed while breastfeeding.  Do not apply to nipple and areola.
Birth Control Pills Pregnancy And Lactation Text: This medication should be avoided if pregnant and for the first 30 days post-partum.
Benzoyl Peroxide Pregnancy And Lactation Text: This medication is Pregnancy Category C. It is unknown if benzoyl peroxide is excreted in breast milk.
Sarecycline Counseling: Patient advised regarding possible photosensitivity and discoloration of the teeth, skin, lips, tongue and gums.  Patient instructed to avoid sunlight, if possible.  When exposed to sunlight, patients should wear protective clothing, sunglasses, and sunscreen.  The patient was instructed to call the office immediately if the following severe adverse effects occur:  hearing changes, easy bruising/bleeding, severe headache, or vision changes.  The patient verbalized understanding of the proper use and possible adverse effects of sarecycline.  All of the patient's questions and concerns were addressed.
Winlevi Counseling:  I discussed with the patient the risks of topical clascoterone including but not limited to erythema, scaling, itching, and stinging. Patient voiced their understanding.
Tazorac Counseling:  Patient advised that medication is irritating and drying.  Patient may need to apply sparingly and wash off after an hour before eventually leaving it on overnight.  The patient verbalized understanding of the proper use and possible adverse effects of tazorac.  All of the patient's questions and concerns were addressed.
Tetracycline Pregnancy And Lactation Text: This medication is Pregnancy Category D and not consider safe during pregnancy. It is also excreted in breast milk.
Isotretinoin Counseling: Patient should get monthly blood tests, not donate blood, not drive at night if vision affected, not share medication, and not undergo elective surgery for 6 months after tx completed. Side effects reviewed, pt to contact office should one occur.
Erythromycin Counseling:  I discussed with the patient the risks of erythromycin including but not limited to GI upset, allergic reaction, drug rash, diarrhea, increase in liver enzymes, and yeast infections.
Topical Retinoid counseling:  Patient advised to apply a pea-sized amount only at bedtime and wait 30 minutes after washing their face before applying.  If too drying, patient may add a non-comedogenic moisturizer. The patient verbalized understanding of the proper use and possible adverse effects of retinoids.  All of the patient's questions and concerns were addressed.
Spironolactone Pregnancy And Lactation Text: This medication can cause feminization of the male fetus and should be avoided during pregnancy. The active metabolite is also found in breast milk.
Azithromycin Pregnancy And Lactation Text: This medication is considered safe during pregnancy and is also secreted in breast milk.
Bactrim Pregnancy And Lactation Text: This medication is Pregnancy Category D and is known to cause fetal risk.  It is also excreted in breast milk.
Topical Clindamycin Counseling: Patient counseled that this medication may cause skin irritation or allergic reactions.  In the event of skin irritation, the patient was advised to reduce the amount of the drug applied or use it less frequently.   The patient verbalized understanding of the proper use and possible adverse effects of clindamycin.  All of the patient's questions and concerns were addressed.
Topical Sulfur Applications Pregnancy And Lactation Text: This medication is Pregnancy Category C and has an unknown safety profile during pregnancy. It is unknown if this topical medication is excreted in breast milk.
High Dose Vitamin A Pregnancy And Lactation Text: High dose vitamin A therapy is contraindicated during pregnancy and breast feeding.
Benzoyl Peroxide Counseling: Patient counseled that medicine may cause skin irritation and bleach clothing.  In the event of skin irritation, the patient was advised to reduce the amount of the drug applied or use it less frequently.   The patient verbalized understanding of the proper use and possible adverse effects of benzoyl peroxide.  All of the patient's questions and concerns were addressed.
Topical Clindamycin Pregnancy And Lactation Text: This medication is Pregnancy Category B and is considered safe during pregnancy. It is unknown if it is excreted in breast milk.
Azelaic Acid Counseling: Patient counseled that medicine may cause skin irritation and to avoid applying near the eyes.  In the event of skin irritation, the patient was advised to reduce the amount of the drug applied or use it less frequently.   The patient verbalized understanding of the proper use and possible adverse effects of azelaic acid.  All of the patient's questions and concerns were addressed.
Doxycycline Counseling:  Patient counseled regarding possible photosensitivity and increased risk for sunburn.  Patient instructed to avoid sunlight, if possible.  When exposed to sunlight, patients should wear protective clothing, sunglasses, and sunscreen.  The patient was instructed to call the office immediately if the following severe adverse effects occur:  hearing changes, easy bruising/bleeding, severe headache, or vision changes.  The patient verbalized understanding of the proper use and possible adverse effects of doxycycline.  All of the patient's questions and concerns were addressed.
Dapsone Counseling: I discussed with the patient the risks of dapsone including but not limited to hemolytic anemia, agranulocytosis, rashes, methemoglobinemia, kidney failure, peripheral neuropathy, headaches, GI upset, and liver toxicity.  Patients who start dapsone require monitoring including baseline LFTs and weekly CBCs for the first month, then every month thereafter.  The patient verbalized understanding of the proper use and possible adverse effects of dapsone.  All of the patient's questions and concerns were addressed.
Winlevi Pregnancy And Lactation Text: This medication is considered safe during pregnancy and breastfeeding.
Detail Level: Zone
Medical Necessity Clause: Botulinum toxin hyperhidrosis therapy is medically necessary because
Medical Necessity Information: LCD Guidelines vary from payer to payer. Please check with your payer's policy to determine medical necessity.

## 2023-03-24 NOTE — PROCEDURE: PRESCRIPTION MEDICATION MANAGEMENT
Plan: Patient requested “gel” vehicle \\nShe tolerated tretinoin 0.025% well. wants stronger rx
Initiate Treatment: Arazlo 0.045 % lotion - Apply a pea sized amount to full face every night after cleansing\\n\\nclindamycin 1 % topical gel - Apply to the full face in the AM after cleansing
Detail Level: Zone
Render In Strict Bullet Format?: No
Initiate Treatment: hydroquinone 4 % topical cream - Apply pea size amount to full face once daily x8 weeks then 8 weeks off. Then repeat

## 2023-06-06 LAB
C TRACH RRNA SPEC QL NAA+PROBE: NOT DETECTED
N GONORRHOEA RRNA SPEC QL NAA+PROBE: NOT DETECTED
QST (ALWAYS MESSAGE): NORMAL

## 2023-06-16 ENCOUNTER — TELEPHONE (OUTPATIENT)
Dept: OBSTETRICS AND GYNECOLOGY | Facility: CLINIC | Age: 28
End: 2023-06-16
Payer: COMMERCIAL

## 2023-06-16 NOTE — TELEPHONE ENCOUNTER
Pt is requesting a script for a yeast infection (Diflucan). Pt next annual 7/31/23. Thanks  Medicine Refill Request    Last Office: Visit date not found   Last Consult Visit: Visit date not found  Last Telemedicine Visit: Visit date not found    Next Appointment: Visit date not found      Current Outpatient Medications:   •  etonogestreL-ethinyl estradioL (NUVARING) 0.12-0.015 mg/24 hr vaginal ring, Insert vaginally and leave in for 3 consecutive weeks, then remove for 1 week. Nuvaring, brand name only (Patient not taking: Reported on 2/23/2023), Disp: 3 each, Rfl: 2  •  spironolactone (ALDACTONE ORAL), Take by mouth. Under derm, Disp: , Rfl:       BP Readings from Last 3 Encounters:   02/23/23 122/72   07/15/22 124/80   05/13/22 120/78       Recent Lab results:  Lab Results   Component Value Date    CHOL 214 (H) 10/01/2021   ,   Lab Results   Component Value Date    HDL 66 10/01/2021   ,   Lab Results   Component Value Date    LDLCALC 133 (H) 10/01/2021   ,   Lab Results   Component Value Date    TRIG 59 10/01/2021        Lab Results   Component Value Date    GLUCOSE 95 10/01/2021    GLUCOSE NEGATIVE 10/01/2021   , No results found for: HGBA1C      Lab Results   Component Value Date    CREATININE 0.78 10/01/2021       No results found for: TSH

## 2023-06-19 RX ORDER — FLUCONAZOLE 150 MG/1
150 TABLET ORAL ONCE
Qty: 2 TABLET | Refills: 0 | Status: SHIPPED | OUTPATIENT
Start: 2023-06-19 | End: 2023-06-19

## 2023-07-31 ENCOUNTER — OFFICE VISIT (OUTPATIENT)
Dept: OBSTETRICS AND GYNECOLOGY | Facility: CLINIC | Age: 28
End: 2023-07-31
Payer: COMMERCIAL

## 2023-07-31 VITALS
BODY MASS INDEX: 32.14 KG/M2 | HEIGHT: 66 IN | WEIGHT: 200 LBS | DIASTOLIC BLOOD PRESSURE: 80 MMHG | SYSTOLIC BLOOD PRESSURE: 118 MMHG

## 2023-07-31 DIAGNOSIS — Z01.419 WOMEN'S ANNUAL ROUTINE GYNECOLOGICAL EXAMINATION: Primary | ICD-10-CM

## 2023-07-31 DIAGNOSIS — Z30.44 ENCOUNTER FOR SURVEILLANCE OF VAGINAL RING HORMONAL CONTRACEPTIVE DEVICE: ICD-10-CM

## 2023-07-31 DIAGNOSIS — Z20.2 POSSIBLE EXPOSURE TO STD: ICD-10-CM

## 2023-07-31 DIAGNOSIS — N89.8 VAGINAL DISCHARGE: ICD-10-CM

## 2023-07-31 DIAGNOSIS — N76.0 ACUTE VAGINITIS: ICD-10-CM

## 2023-07-31 PROCEDURE — 99395 PREV VISIT EST AGE 18-39: CPT | Performed by: OBSTETRICS & GYNECOLOGY

## 2023-07-31 PROCEDURE — 3008F BODY MASS INDEX DOCD: CPT | Performed by: OBSTETRICS & GYNECOLOGY

## 2023-07-31 RX ORDER — ETONOGESTREL AND ETHINYL ESTRADIOL .12; .015 MG/D; MG/D
INSERT, EXTENDED RELEASE VAGINAL
Qty: 1 EACH | Refills: 11 | Status: SHIPPED | OUTPATIENT
Start: 2023-07-31 | End: 2023-09-25 | Stop reason: SDUPTHER

## 2023-07-31 ASSESSMENT — ENCOUNTER SYMPTOMS
DECREASED CONCENTRATION: 0
DEPRESSION: 0
SORE THROAT: 0
BACK PAIN: 0
DIFFICULTY URINATING: 0
JOINT SWELLING: 0
NERVOUS/ANXIOUS: 0
PHOTOPHOBIA: 0
MYALGIAS: 0
HEMATOLOGIC/LYMPHATIC NEGATIVE: 1
LIGHT-HEADEDNESS: 0
SHORTNESS OF BREATH: 0
PALPITATIONS: 0
HEADACHES: 0
SINUS PRESSURE: 0
WEAKNESS: 0
WHEEZING: 0
EYE REDNESS: 0
TROUBLE SWALLOWING: 0
FLANK PAIN: 0
ENDOCRINE NEGATIVE: 1
ALLERGIC/IMMUNOLOGIC NEGATIVE: 1
DYSURIA: 0
HEMATURIA: 0
CONSTITUTIONAL NEGATIVE: 1
COUGH: 0
SLEEP DISTURBANCE: 0
CHEST TIGHTNESS: 0
GASTROINTESTINAL NEGATIVE: 1
FREQUENCY: 0

## 2023-07-31 NOTE — PROGRESS NOTES
"2023  Stacie Hough is a 27 y.o. female who presents for annual exam.  Chart reviewed  Was using Nuvaring; has stopped but would like to restart     Periods are regular every 28-30 days, lasting 4 days. Dysmenorrhea:mild, occurring first 1-2 days of flow. Cyclic symptoms include none. No intermenstrual bleeding, spotting, or discharge.  Changes pad/tampon every 4 hours on heavy days; she has heavy low first 2 days   The patient is sexually active. GYN screening history: last pap: was normal. Domestic violence: no.     Current contraception: condoms or nothing  History of abnormal Pap smear: no  Family history of uterine or ovarian cancer: no  Regular self breast exam: no  History of abnormal mammogram: no  Family history of breast cancer: no  History of abnormal lipids: no  Menstrual History:  OB History        0    Para   0    Term   0       0    AB   0    Living   0       SAB   0    IAB   0    Ectopic   0    Multiple   0    Live Births   0                Patient's last menstrual period was 2023.         Review of Systems and Physical Exam  The following have been reviewed and updated as appropriate in this visit:  Allergies  Meds  Problems       Visit Vitals  /80 (BP Location: Left upper arm, Patient Position: Sitting)   Ht 1.676 m (5' 6\")   Wt 90.7 kg (200 lb)   LMP 2023   BMI 32.28 kg/m²     HPI  Review of Systems   Constitutional: Negative.    HENT: Negative for sinus pressure, sneezing, sore throat and trouble swallowing.    Eyes: Negative for photophobia, redness and visual disturbance.   Respiratory: Negative for cough, chest tightness, shortness of breath and wheezing.    Cardiovascular: Negative for chest pain, palpitations and leg swelling.   Gastrointestinal: Negative.    Endocrine: Negative.    Genitourinary: Negative for decreased urine volume, difficulty urinating, dyspareunia, dysuria, flank pain, frequency, genital sores, hematuria, irregular menses, " nocturia, pain with intercourse, pelvic pain, sexual dysfunction, urgency, vaginal bleeding, vaginal discharge, vaginal itching and vaginal pain.   Breast: Negative.   Musculoskeletal: Negative for back pain, joint swelling and myalgias.   Skin: Negative.    Allergic/Immunologic: Negative.    Neurological: Negative for weakness, light-headedness and headaches.   Hematological: Negative.    Psychiatric/Behavioral: Negative for decreased concentration, depression, sleep disturbance and suicidal ideas. The patient is not nervous/anxious.        Physical Exam  Constitutional:       General: She is not in acute distress.     Appearance: Normal appearance. She is well-developed. She is not diaphoretic.   HENT:      Head: Normocephalic and atraumatic.      Nose: Nose normal.     Eyes:      General: No scleral icterus.        Right eye: No discharge.         Left eye: No discharge.      Conjunctiva/sclera: Conjunctivae normal.      Pupils: Pupils are equal, round, and reactive to light.   Genitourinary:    Urethra, bladder, cervix, uterus, urethral meatus, external female genitalia and adnexa normal.   Pelvic exam was performed with patient in lithotomy exam position.      No labial rash.   No signs of erythema or atrophy. There is no right labia majora lesion or labia minora lesion. There is no left labia majora lesion or labia minora lesion. There is no lesion, tenderness, injury or Bartholin's cyst on the right external genitalia. There is no lesion, tenderness, injury or no Bartholin's cyst on the left external genitalia. There is no urethral meatus prolapse, lesion or bleeding. There is no urethral discharge, erythema, tenderness, urethrocele or urethral diverticulum. The urethra is not everted. Anchor's glands normal.    Vaginal discharge present.   Uterine contour is regular. Uterus is anteverted.   Neck:      Thyroid: No thyromegaly.   Cardiovascular:      Rate and Rhythm: Normal rate and regular rhythm.      Pulses:  Normal pulses.   Chest:   Breasts:     Breasts are symmetrical.      Right: Normal. No inverted nipple, mass, nipple discharge, skin change or tenderness.      Left: Normal. No inverted nipple, mass, nipple discharge, skin change or tenderness.   Abdominal:      General: Abdomen is flat. There is no distension.      Palpations: Abdomen is soft. There is no mass.      Tenderness: There is no abdominal tenderness. There is no right CVA tenderness, left CVA tenderness, guarding or rebound.      Hernia: No hernia is present.   Musculoskeletal:         General: No tenderness or deformity. Normal range of motion.      Cervical back: Normal range of motion and neck supple.   Lymphadenopathy:      Cervical: No cervical adenopathy.      Upper Body:      Right upper body: No supraclavicular, axillary or pectoral adenopathy.      Left upper body: No supraclavicular, axillary or pectoral adenopathy.   Neurological:      Mental Status: She is alert and oriented to person, place, and time.      Cranial Nerves: No cranial nerve deficit.   Skin:     General: Skin is warm and dry.      Coloration: Skin is not pale.      Findings: No erythema or rash.   Psychiatric:         Behavior: Behavior normal.          Diagnoses and all orders for this visit:    Women's annual routine gynecological examination    Encounter for surveillance of vaginal ring hormonal contraceptive device  -     NUVARING 0.12-0.015 mg/24 hr vaginal ring; Insert vaginally and leave in for 3 consecutive weeks, then remove for 1 week.    Possible exposure to STD  -     Hepatitis C antibody; Future  -     Hepatitis B surface antigen; Future  -     HIV 1,2 AB P24 AG; Future  -     Syphilis Antibodies; Future  -     Herpes Simplex Virus 1/2 (IgG) w Reflex to HSV2; Future  -     SureSwab(R) Advanced Vaginitis, TMA    Acute vaginitis  -     SureSwab(R) Advanced Vaginitis, TMA    Vaginal discharge  -     SureSwab(R) Advanced Vaginitis, TMA    .  Breast self exam technique  reviewed and patient encouraged to perform self-exam monthly..  Return in about 1 year (around 7/31/2024) for annual gyn exam.  Aggie Lira MD

## 2023-08-01 LAB
BV BACTERIA RRNA VAG QL NAA+PROBE: NEGATIVE
C GLABRATA RNA VAG QL NAA+PROBE: NOT DETECTED
CANDIDA RRNA VAG QL PROBE: NOT DETECTED
T VAGINALIS RRNA SPEC QL NAA+PROBE: NOT DETECTED

## 2023-09-25 DIAGNOSIS — Z30.44 ENCOUNTER FOR SURVEILLANCE OF VAGINAL RING HORMONAL CONTRACEPTIVE DEVICE: ICD-10-CM

## 2023-09-25 RX ORDER — ETONOGESTREL AND ETHINYL ESTRADIOL .12; .015 MG/D; MG/D
INSERT, EXTENDED RELEASE VAGINAL
Qty: 1 EACH | Refills: 11 | Status: SHIPPED | OUTPATIENT
Start: 2023-09-25 | End: 2023-10-02 | Stop reason: SDUPTHER

## 2023-10-02 DIAGNOSIS — Z30.44 ENCOUNTER FOR SURVEILLANCE OF VAGINAL RING HORMONAL CONTRACEPTIVE DEVICE: ICD-10-CM

## 2023-10-02 RX ORDER — ETONOGESTREL AND ETHINYL ESTRADIOL .12; .015 MG/D; MG/D
INSERT, EXTENDED RELEASE VAGINAL
Qty: 1 EACH | Refills: 11 | Status: SHIPPED | OUTPATIENT
Start: 2023-10-02 | End: 2024-10-01

## 2024-03-07 NOTE — RESULT ENCOUNTER NOTE
I called Stacie and informed her that her STD labs are negative for hepatitis B, hepatitis C, and HIV.  Syphilis antibody testing is negative.  Explained herpes type I and II antibodies are negative so that means she has had no exposure to herpes.  All questions answered

## 2024-03-09 LAB
HBV SURFACE AG SERPL QL IA: NORMAL
HCV AB SERPL QL IA: NORMAL
HIV 1+2 AB+HIV1 P24 AG SERPL QL IA: NORMAL
HSV1 IGG SER IA-ACNC: <0.9 INDEX
HSV2 IGG SER IA-ACNC: <0.9 INDEX
T PALLIDUM AB SER QL IA: NEGATIVE

## 2024-08-05 NOTE — PROCEDURE: DEFER
Introduction Text (Please End With A Colon): The following procedure was deferred:
Detail Level: Detailed
X Size Of Lesion In Cm (Optional): 0
Instructions (Optional): Gave pt information for [facility] @ 
unknown